# Patient Record
Sex: MALE | Race: WHITE | NOT HISPANIC OR LATINO | Employment: OTHER | URBAN - METROPOLITAN AREA
[De-identification: names, ages, dates, MRNs, and addresses within clinical notes are randomized per-mention and may not be internally consistent; named-entity substitution may affect disease eponyms.]

---

## 2017-05-26 ENCOUNTER — OFFICE VISIT (OUTPATIENT)
Dept: MEDICAL GROUP | Facility: MEDICAL CENTER | Age: 67
End: 2017-05-26
Payer: MEDICARE

## 2017-05-26 VITALS
TEMPERATURE: 97.5 F | HEIGHT: 75 IN | WEIGHT: 225 LBS | OXYGEN SATURATION: 97 % | HEART RATE: 89 BPM | SYSTOLIC BLOOD PRESSURE: 122 MMHG | BODY MASS INDEX: 27.98 KG/M2 | DIASTOLIC BLOOD PRESSURE: 76 MMHG

## 2017-05-26 DIAGNOSIS — E78.5 DYSLIPIDEMIA: Chronic | ICD-10-CM

## 2017-05-26 DIAGNOSIS — Z85.46 HISTORY OF PROSTATE CANCER: Chronic | ICD-10-CM

## 2017-05-26 DIAGNOSIS — N52.9 ERECTILE DYSFUNCTION, UNSPECIFIED ERECTILE DYSFUNCTION TYPE: ICD-10-CM

## 2017-05-26 PROCEDURE — 1036F TOBACCO NON-USER: CPT | Performed by: INTERNAL MEDICINE

## 2017-05-26 PROCEDURE — 4040F PNEUMOC VAC/ADMIN/RCVD: CPT | Performed by: INTERNAL MEDICINE

## 2017-05-26 PROCEDURE — 99213 OFFICE O/P EST LOW 20 MIN: CPT | Performed by: INTERNAL MEDICINE

## 2017-05-26 PROCEDURE — 3017F COLORECTAL CA SCREEN DOC REV: CPT | Performed by: INTERNAL MEDICINE

## 2017-05-26 PROCEDURE — G8419 CALC BMI OUT NRM PARAM NOF/U: HCPCS | Performed by: INTERNAL MEDICINE

## 2017-05-26 PROCEDURE — G8432 DEP SCR NOT DOC, RNG: HCPCS | Performed by: INTERNAL MEDICINE

## 2017-05-26 PROCEDURE — 1101F PT FALLS ASSESS-DOCD LE1/YR: CPT | Performed by: INTERNAL MEDICINE

## 2017-05-26 RX ORDER — M-VIT,TX,IRON,MINS/CALC/FOLIC 27MG-0.4MG
1 TABLET ORAL DAILY
COMMUNITY
End: 2021-02-23

## 2017-05-26 RX ORDER — CHLORAL HYDRATE 500 MG
1000 CAPSULE ORAL
COMMUNITY
End: 2021-02-23

## 2017-05-26 RX ORDER — SILDENAFIL 100 MG/1
100 TABLET, FILM COATED ORAL PRN
Qty: 15 TAB | Refills: 6 | Status: SHIPPED | OUTPATIENT
Start: 2017-05-26 | End: 2017-11-20 | Stop reason: SDUPTHER

## 2017-05-26 ASSESSMENT — PATIENT HEALTH QUESTIONNAIRE - PHQ9: CLINICAL INTERPRETATION OF PHQ2 SCORE: 0

## 2017-05-26 NOTE — PROGRESS NOTES
Subjective:      Roland Hernández is a 66 y.o. male who presents with viagra refill         HPI   Need refill viagra and no side effect with medication, no flushing or headache or vision changes, lives with wife, still exercise walking daily with wife 2.5 miles per day, no chest pain or sob, no lightheadedness or dizziness, no syncope, no headache.  Has blood pressures done through health insurance from work once a year.  Every September.  No anxiety, depression  Viagra has been effective for erectile dysfunction without side effect  No tobacco   etoh 3 drinks per day  Colonoscopy due in November      Current Outpatient Prescriptions   Medication Sig Dispense Refill   • sildenafil citrate (VIAGRA) 100 MG tablet Take 1 Tab by mouth as needed for Erectile Dysfunction (max one per day). 30 Tab 5   • aspirin 81 MG tablet Take 81 mg by mouth every day.     • Calcium Carbonate-Vitamin D (CALCIUM 600 + D PO) Take  by mouth.       No current facility-administered medications for this visit.     Patient Active Problem List    Diagnosis Date Noted   • Impaired fasting glucose 09/14/2013   • S/P appendectomy 09/12/2013   • Preventative health care 09/12/2013   • Schatzki's ring 09/12/2013   • Dyslipidemia 04/09/2013   • Erectile dysfunction 04/09/2013   • History of prostate cancer 04/09/2013   • History of tobacco abuse 04/09/2013       Schatzki's ring  11/14/12 EGD per [facility], schatzki's ring, gastritis, 57 french dilation    Status post appendectomy    Preventative health  11/14/12 [facility] colon repeat 5 yrs per [facility]  4/19/13 pneumovax no records  4/19/13 tdap  9/12/13 zoster vaccine  9/26/13 vit d 40  12/5/14 [facility] x 3 [facility], [employer] benefit plan  [facility]  12/4/15 spirometry FEV1 2.9, 72% predicted,[employer] benefit plan  12/4/15 ekg normal sinus rhythm no change,[employer] benefit plan  12/4/15 FOBT pending per [employer] benefit plan  12/4/15 psa 0.1 done at [facility]  plan    Impaired fasting blood sugar  6/7/10 A1c 6.0%, bs 109  12/5/11 A1c 5.8%, bs 146  9/26/13 A1c 6.1%,bs 140  12/5/14 A1c 5.8%,bs 111  12/5/15 A1c 5.9% done at [facility] benefit plan    History tobacco  Hx of tobacco quit 2010 after smoking 30 yrs    History of prostate cancer  2003 diagnosed with prostate cancer s/p radical prostactomy   4/14/04 psa <0.1  11/5/08 psa <0.1  6/7/10 psa <0.1  12/5/11 psa <0.1  9/26/13 psa <0.01  12/5/14 psa <0.1  12/5/15 psa 0.1 done at [facility] benefit plan    Dyslipidemia  10/17/04 chol 230,trig 67,hdl 63,ldl 154  11/5/08 chol 243,trig 69,hdl 63,ldl 166  2010 started on niacin 2000 mg daily  6/7/10 chol 234,trig 114,hdl 63,ldl 148  12/5/11 chol 198,trig 96,hdl 57,ldl 122,crp 0.8  9/26/13 chol 249,trig 143,hdl 53,ldl 167,LDL-P 2088,HDL-P 42,LP-IR 70  12/5/14 chol 214,trig 62,hdl 74,ldl 128,crp 0.8  12/5/15 chol 190,trig 62,hdl 64,ldl 114 done at [facility] plan    Depression Screening    Little interest or pleasure in doing things?  0 - not at all  Feeling down, depressed , or hopeless? 0 - not at all  Patient Health Questionnaire Score: 0   If depressive symptoms identified deferred to follow up visit unless specifically addressed in assessment and plan.      ROS       Objective:          Physical Exam   Constitutional: He appears well-developed and well-nourished. No distress.   HENT:   Head: Normocephalic and atraumatic.   Mouth/Throat: Oropharynx is clear and moist.   Eyes: Conjunctivae are normal.   Neck: Neck supple. No JVD present.   Cardiovascular: Normal rate, regular rhythm and normal heart sounds.    Pulmonary/Chest: Effort normal. No respiratory distress. He has no wheezes.   Abdominal: He exhibits no distension.   Musculoskeletal: He exhibits no edema.   Skin: Skin is warm. He is not diaphoretic.   Psychiatric: He has a normal mood and affect. His behavior is normal.   Nursing note and vitals reviewed.              Assessment/Plan:     Assessment  #1 erectile dysfunction  stable and controlled with Viagra without side effects    #2 History of prostate cancer last PSA 12/5/15 psa 0.1 done at [facility] benefit plan    #3 dyslipidemia 12/5/15 chol 190,trig 62,hdl 64,ldl 114 done at [facility] benefit plan    #4 Preventative health  11/14/12 [facility] colon repeat 5 yrs per [facility]  4/19/13 pneumovax no records  4/19/13 tdap  9/12/13 zoster vaccine  9/26/13 vit d 40  12/5/14 FOBT x 3 negative in [facility], [facility] benefit plan  12/4/15 audiometry test in [facility] benefit plan  12/4/15 spirometry FEV1 2.9, 72% predicted,[facility] benefit plan  12/4/15 ekg normal sinus rhythm no change,[facility] benefit plan  12/4/15 FOBT pending per [facility] benefit plan  12/4/15 psa 0.1 done at [facility] benefit plan    #5 impaired fasting blood sugar    #6 EtOH 3- drinks per day       Plan   #!  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

## 2017-05-26 NOTE — MR AVS SNAPSHOT
"        Roland Hernández   2017 1:40 PM   Office Visit   MRN: [id number]    Department:  [facility]   Dept Phone:  [phone]    Description:  Male : 1950   Provider:  Marcell Martinez M.D.           Allergies as of 2017     Allergen Noted Reactions    Nkda [No Known Drug Allergy] 2010         You were diagnosed with     Erectile dysfunction, unspecified erectile dysfunction type   [4465665]       Dyslipidemia   [559471]       History of prostate cancer   [793153]         Vital Signs     Blood Pressure Pulse Temperature Height Weight Body Mass Index    122/76 mmHg 89 36.4 °C (97.5 °F) 1.905 m (6' 3\") 102.059 kg (225 lb) 28.12 kg/m2    Oxygen Saturation Smoking Status                97% Former Smoker          Basic Information     Date Of Birth Sex Race Ethnicity Preferred Language    1950 Male White Non- English      Problem List              ICD-10-CM Priority Class Noted - Resolved    Dyslipidemia (Chronic) E78.5   2013 - Present    Erectile dysfunction N52.9   2013 - Present    History of prostate cancer (Chronic) Z85.46   2013 - Present    History of tobacco abuse Z87.891   2013 - Present    S/P appendectomy Z90.49   2013 - Present    Preventative health care (Chronic) Z00.00   2013 - Present    Schatzki's ring K22.2   2013 - Present    Impaired fasting glucose (Chronic) R73.01   2013 - Present      Health Maintenance        Date Due Completion Dates    COLONOSCOPY 2017    IMM PNEUMOCOCCAL 65+ (ADULT) LOW/MEDIUM RISK SERIES (2 of 2 - PPSV23) 2018 10/27/2015, 2013    IMM DTaP/Tdap/Td Vaccine (3 - Td) 10/27/2025 10/27/2015, 2013            Current Immunizations     13-VALENT PCV PREVNAR 10/27/2015    Influenza TIV (IM) 10/26/2014    Pneumococcal polysaccharide vaccine (PPSV-23) 2013    SHINGLES VACCINE 2013    Tdap Vaccine 10/27/2015, 2013      Below and/or attached are the medications " your provider expects you to take. Review all of your home medications and newly ordered medications with your provider and/or pharmacist. Follow medication instructions as directed by your provider and/or pharmacist. Please keep your medication list with you and share with your provider. Update the information when medications are discontinued, doses are changed, or new medications (including over-the-counter products) are added; and carry medication information at all times in the event of emergency situations     Allergies:  NKDA - (reactions not documented)               Medications  Valid as of: May 26, 2017 -  1:58 PM    Generic Name Brand Name Tablet Size Instructions for use    Aspirin (Tab) aspirin 81 MG Take 81 mg by mouth every day.        Calcium Carb-Cholecalciferol   Take  by mouth.        Multiple Vitamins-Minerals (Tab) THERAGRAN-M  Take 1 Tab by mouth every day.        Omega-3 Fatty Acids (Cap) fish oil 1000 MG Take 1,000 mg by mouth 3 times a day, with meals.        Sildenafil Citrate (Tab) VIAGRA 100 MG Take 1 Tab by mouth as needed for Erectile Dysfunction (max one per day).        .                 Medicines prescribed today were sent to:     [facility] [address] [place] [address] - [address]    [address] [facility] [place] [address]    Phone: [phone] Fax: [phone]    Open 24 Hours?: No    [facility] - [address] [facility] [[email or web address] [address]    Phone: [phone] Fax: [phone]    Open 24 Hours?: No      Medication refill instructions:       If your prescription bottle indicates you have medication refills left, it is not necessary to call your provider’s office. Please contact your pharmacy and they will refill your medication.    If your prescription bottle indicates you do not have any refills left, you may request refills at any time through one of the following ways: The online [employer] system (except Urgent  Care), by calling your provider’s office, or by asking your pharmacy to contact your provider’s office with a refill request. Medication refills are processed only during regular business hours and may not be available until the next business day. Your provider may request additional information or to have a follow-up visit with you prior to refilling your medication.   *Please Note: Medication refills are assigned a new Rx number when refilled electronically. Your pharmacy may indicate that no refills were authorized even though a new prescription for the same medication is available at the pharmacy. Please request the medicine by name with the pharmacy before contacting your provider for a refill.        Other Notes About Your Plan     3/2/16 blood pressure readings at home systolic 120-130, diastolic 70-80,drop off blood pressure readings in another 3 months  Need all labs include a1c  11/17 colon due [facility]                   MyChart Access Code: Activation code not generated  Current [employer] Status: Active

## 2017-11-20 DIAGNOSIS — N52.9 ERECTILE DYSFUNCTION, UNSPECIFIED ERECTILE DYSFUNCTION TYPE: ICD-10-CM

## 2017-11-20 RX ORDER — SILDENAFIL 100 MG/1
100 TABLET, FILM COATED ORAL PRN
Qty: 15 TAB | Refills: 5 | Status: SHIPPED | OUTPATIENT
Start: 2017-11-20 | End: 2017-12-05 | Stop reason: SDUPTHER

## 2017-11-21 NOTE — TELEPHONE ENCOUNTER
Viagra    Was the patient seen in the last year in this department? Yes  5/26/17    Does patient have an active prescription for medications requested? No     Received Request Via: Pharmacy

## 2017-12-05 DIAGNOSIS — N52.9 ERECTILE DYSFUNCTION, UNSPECIFIED ERECTILE DYSFUNCTION TYPE: ICD-10-CM

## 2017-12-05 RX ORDER — SILDENAFIL 100 MG/1
100 TABLET, FILM COATED ORAL PRN
Qty: 15 TAB | Refills: 5 | Status: SHIPPED | OUTPATIENT
Start: 2017-12-05 | End: 2018-09-18

## 2017-12-05 NOTE — TELEPHONE ENCOUNTER
Viagra    Was the patient seen in the last year in this department? Yes Last 5/26/17    Does patient have an active prescription for medications requested? No     Received Request Via: Pharmacy

## 2018-05-29 ENCOUNTER — OFFICE VISIT (OUTPATIENT)
Dept: MEDICAL GROUP | Facility: MEDICAL CENTER | Age: 68
End: 2018-05-29
Payer: MEDICARE

## 2018-05-29 ENCOUNTER — TELEPHONE (OUTPATIENT)
Dept: MEDICAL GROUP | Facility: MEDICAL CENTER | Age: 68
End: 2018-05-29

## 2018-05-29 VITALS
SYSTOLIC BLOOD PRESSURE: 124 MMHG | OXYGEN SATURATION: 97 % | BODY MASS INDEX: 26.61 KG/M2 | HEART RATE: 86 BPM | TEMPERATURE: 97.8 F | DIASTOLIC BLOOD PRESSURE: 78 MMHG | HEIGHT: 75 IN | WEIGHT: 214 LBS

## 2018-05-29 DIAGNOSIS — Z85.46 HISTORY OF PROSTATE CANCER: Chronic | ICD-10-CM

## 2018-05-29 DIAGNOSIS — G89.29 CHRONIC RIGHT SHOULDER PAIN: ICD-10-CM

## 2018-05-29 DIAGNOSIS — E78.5 DYSLIPIDEMIA: Chronic | ICD-10-CM

## 2018-05-29 DIAGNOSIS — Z12.5 PROSTATE CANCER SCREENING: ICD-10-CM

## 2018-05-29 DIAGNOSIS — Z23 NEED FOR ZOSTER VACCINE: ICD-10-CM

## 2018-05-29 DIAGNOSIS — R73.01 IMPAIRED FASTING GLUCOSE: Chronic | ICD-10-CM

## 2018-05-29 DIAGNOSIS — Z00.00 PREVENTATIVE HEALTH CARE: Chronic | ICD-10-CM

## 2018-05-29 DIAGNOSIS — Z23 NEED FOR PNEUMOCOCCAL VACCINATION: ICD-10-CM

## 2018-05-29 DIAGNOSIS — M25.519 SHOULDER PAIN, UNSPECIFIED CHRONICITY, UNSPECIFIED LATERALITY: ICD-10-CM

## 2018-05-29 DIAGNOSIS — M25.511 CHRONIC RIGHT SHOULDER PAIN: ICD-10-CM

## 2018-05-29 PROCEDURE — 99214 OFFICE O/P EST MOD 30 MIN: CPT | Performed by: INTERNAL MEDICINE

## 2018-05-29 ASSESSMENT — PATIENT HEALTH QUESTIONNAIRE - PHQ9: CLINICAL INTERPRETATION OF PHQ2 SCORE: 0

## 2018-05-29 NOTE — PROGRESS NOTES
Subjective:      Roland Hernández is a 67 y.o. male who presents with Orders Needed (Referral to [facility] [address] and Medication Refill (Sidenefil [facility]mg to [facility] and 120[facility])            HPI   New complaint right shoulder pain  Has seen [facility] for back and wrist pain previously, more recently has had right shoulder pain and right handed male, mild to moderate pain right shoulder, unable to sleep right side, worse reaching over head and reaching for something behind him, no trauma or falls, no radiation, no sensory changes, no neck pain.  Improved with rest.  Keeps active.  No history of right shoulder dislocation or surgery  Previous pneumococcal 23 vaccine no records  No neck pain, cervical radiculopathy  Previous right wrist surgery, stable occasional right wrist discomfort has seen physical therapy, no NSAIDs, no pain, swelling, decreased right  strength, no sensory changes  No nsaids   No tobacco  Impaired fasting blood sugar, no medication, working on diet, nutrition, no history of diabetes, no sensory changes, no neuropathy, retinopathy, nephropathy  Dyslipidemia diet-controlled  History prostate cancer no recurrence, no dysuria, hematuria, incontinence    Current Outpatient Prescriptions   Medication Sig Dispense Refill   • sildenafil citrate (VIAGRA) 100 MG tablet Take 1 Tab by mouth as needed for Erectile Dysfunction (max one per day). 15 Tab 5   • Omega-3 Fatty Acids (FISH OIL) 1000 MG Cap capsule Take 1,000 mg by mouth 3 times a day, with meals.     • therapeutic multivitamin-minerals (THERAGRAN-M) Tab Take 1 Tab by mouth every day.     • aspirin 81 MG tablet Take 81 mg by mouth every day.     • Calcium Carbonate-Vitamin D (CALCIUM 600 + D PO) Take  by mouth.       No current facility-administered medications for this visit.       Depression Screening    Little interest or pleasure in doing things?  0 - not at all  Feeling down, depressed , or  hopeless? 0 - not at all  Patient Health Questionnaire Score: 0       Health Maintenance Summary                Annual Wellness Visit Overdue 12/27/2015      Done 12/26/2014 Visit Dx: Medicare annual wellness visit, subsequent    COLONOSCOPY Overdue 11/14/2017      Done 11/14/2012 AMB REFERRAL TO GI FOR COLONOSCOPY    IMM PNEUMOCOCCAL 65+ (ADULT) LOW/MEDIUM RISK SERIES Overdue 4/9/2018      Done 10/27/2015 Imm Admin: Pneumococcal Conjugate Vaccine (Prevnar/PCV-13)     Patient has more history with this topic...    IMM INFLUENZA Next Due 9/1/2018      Done 10/26/2014 Imm Admin: Influenza TIV (IM)    IMM DTaP/Tdap/Td Vaccine Next Due 10/27/2025      Done 10/27/2015 Imm Admin: Tdap Vaccine     Patient has more history with this topic...          Patient Care Team:  Marcell Martinez M.D. as PCP - General (Internal Medicine)    Schatzki's ring  11/14/12 EGD per [facility], schatzki's ring, gastritis, 57 Cuban dilation     Status post appendectomy     Preventative health  11/14/12 [facility] colon repeat 5 yrs per [facility]  4/19/13 pneumovax no records  4/19/13 tdap  9/12/13 zoster vaccine  9/26/13 vit d 40  12/5/14 FOBT x 3 negative in [facility] benefit plan  10/27/15 prevnar  [facility]  12/4/15 spirometry FEV1 2.9, 72% predicted,[employer] benefit plan  12/4/15 EKG normal sinus rhythm no change,[employer] benefit plan  12/4/15 FOBT pending per [employer] benefit plan  10/17/17 cxr per [facility] plan negative  10/17/17 EKG per [facility] plan negative  10/17/17 psa<0.1 per [employer] plan  10/17/17 spirometry FEV1 2.9,FVC 4.0 per [facility] plan    Impaired fasting blood sugar  6/7/10 A1c 6.0%, bs 109  12/5/11 A1c 5.8%, bs 146  9/26/13 A1c 6.1%,bs 140  12/5/14 A1c 5.8%,bs 111  12/5/15 A1c 5.9% done at [employer] benefit plan  10/17/17 A1c 5.5% per [employer] plan     History tobacco  Hx of tobacco quit 2010 after smoking 30 yrs     History of prostate cancer  2003 diagnosed with prostate cancer s/p radical prostactomy   4/14/04  "psa <0.1  11/5/08 psa <0.1  6/7/10 psa <0.1  12/5/11 psa <0.1  9/26/13 psa <0.01  12/5/14 psa <0.1  12/5/15 psa 0.1 done at [facility] benefit plan  10/17/17 psa<0.1 per [employer] plan     Dyslipidemia  10/17/04 chol 230,trig 67,hdl 63,ldl 154  11/5/08 chol 243,trig 69,hdl 63,ldl 166  2010 started on niacin 2000 mg daily  6/7/10 chol 234,trig 114,hdl 63,ldl 148  12/5/11 chol 198,trig 96,hdl 57,ldl 122,crp 0.8  9/26/13 chol 249,trig 143,hdl 53,ldl 167,LDL-P 2088,HDL-P 42,LP-IR 70  12/5/14 chol 214,trig 62,hdl 74,ldl 128,crp 0.8  12/5/15 chol 190,trig 62,hdl 64,ldl 114 done at [facility] benefit [facility]  10/17/17 chol 250,trig 134,hdl 58,ldl 165 per [facility]       Patient Active Problem List   Diagnosis   • Dyslipidemia   • Erectile dysfunction   • History of prostate cancer   • History of tobacco abuse   • S/P appendectomy   • Preventative health care   • Schatzki's ring   • Impaired fasting glucose         ROS       Objective:     /78   Pulse 86   Temp 36.6 °C (97.8 °F)   Ht 1.905 m (6' 3\")   Wt 97.1 kg (214 lb)   SpO2 97%   BMI 26.75 kg/m²      Physical Exam   Constitutional: He appears well-developed and well-nourished. No distress.   HENT:   Head: Normocephalic and atraumatic.   Right Ear: External ear normal.   Left Ear: External ear normal.   Mouth/Throat: Oropharynx is clear and moist.   Eyes: Conjunctivae are normal. Right eye exhibits no discharge. Left eye exhibits no discharge.   Neck: Neck supple. No JVD present. No thyromegaly present.   Cardiovascular: Normal rate, regular rhythm and normal heart sounds.    Pulmonary/Chest: Effort normal and breath sounds normal. No respiratory distress. He has no wheezes.   Abdominal: He exhibits no distension.   Musculoskeletal: He exhibits no edema.   Skin: Skin is warm. He is not diaphoretic.   Psychiatric: He has a normal mood and affect. His behavior is normal.   Nursing note and vitals reviewed.    Right shoulder no tenderness to palpation clavicle glenohumeral " joint  Right shoulder range of motion 120° abduction, 150° extension  No right elbow pain, normal right elbow range of motion  No right forearm pain or discomfort, no edema  Normal right  strength  Normal sensation hand          Assessment/Plan:     Assessment  #! Shoulder pain right side with questionable impingement syndrome versus calcium deposits, less likely rotator cuff, no evidence of cervical radiculopathy    #2 impaired fasting blood sugar    #3 history of prostate cancer status post prostatectomy    #4 dyslipidemia diet-controlled    #5 history of low vitamin D      Plan  #1 physical therapy right shoulder    #2 right shoulder x-ray    #3 over-the-counter Aleve if necessary    #4 labs    #5 had colonoscopy a few months ago digestive health will obtain records    #6 we are out of pneumococcal 23 vaccination, he will schedule his assessment, we can administer pneumococcal 23 if in stock at that time    #6 prescription shingles vaccine provided

## 2018-05-29 NOTE — LETTER
[facility]  Marcell Martinez M.D.  [address]  [place] [address]  Fax: [phone]   Authorization for Release/Disclosure of   Protected Health Information   Name: LENO HDZ : 1950 SSN: [id number]   Address: [address] [place] [address] [place] [address] Phone:    [phone] (home)    I authorize the entity listed below to release/disclose the PHI below to:   [facility]/Marcell Martinez M.D. and Marcell Martinez M.D.   Provider or Entity Name:  [facility]   Address   City, State, Zip:               [address] [place] [address], [place] [address]   Phone:  [phone]      Fax:      [phone]        Reason for request: continuity of care   Information to be released:    [ X ] LAST COLONOSCOPY,  including any PATH REPORT and follow-up  [ X ] LAST FIT/COLOGUARD RESULT [  ] LAST DEXA  [  ] LAST MAMMOGRAM  [  ] LAST PAP  [  ] LAST LABS [  ] RETINA EXAM REPORT  [  ] IMMUNIZATION RECORDS  [  ] Release all info      [  ] Check here and initial the line next to each item to release ALL health information INCLUDING  _____ Care and treatment for drug and / or alcohol abuse  _____ HIV testing, infection status, or AIDS  _____ Genetic Testing    DATES OF SERVICE OR TIME PERIOD TO BE DISCLOSED: _____________  I understand and acknowledge that:  * This Authorization may be revoked at any time by you in writing, except if your health information has already been used or disclosed.  * Your health information that will be used or disclosed as a result of you signing this authorization could be re-disclosed by the recipient. If this occurs, your re-disclosed health information may no longer be protected by State or Federal laws.  * You may refuse to sign this Authorization. Your refusal will not affect your ability to obtain treatment.  * This Authorization becomes effective upon signing and will  on (date) __________.      If no date is indicated, this Authorization will   one (1) year from the signature date.    Name: Roland Hernández    Signature:   Date:     5/29/2018       PLEASE FAX REQUESTED RECORDS BACK TO: [phone]

## 2018-06-07 ENCOUNTER — HOSPITAL ENCOUNTER (OUTPATIENT)
Dept: RADIOLOGY | Facility: MEDICAL CENTER | Age: 68
End: 2018-06-07
Attending: INTERNAL MEDICINE
Payer: MEDICARE

## 2018-06-07 ENCOUNTER — HOSPITAL ENCOUNTER (OUTPATIENT)
Dept: LAB | Facility: MEDICAL CENTER | Age: 68
End: 2018-06-07
Attending: INTERNAL MEDICINE
Payer: MEDICARE

## 2018-06-07 ENCOUNTER — TELEPHONE (OUTPATIENT)
Dept: MEDICAL GROUP | Facility: MEDICAL CENTER | Age: 68
End: 2018-06-07

## 2018-06-07 DIAGNOSIS — Z12.5 PROSTATE CANCER SCREENING: ICD-10-CM

## 2018-06-07 DIAGNOSIS — R73.01 IMPAIRED FASTING GLUCOSE: Chronic | ICD-10-CM

## 2018-06-07 DIAGNOSIS — E78.5 DYSLIPIDEMIA: Chronic | ICD-10-CM

## 2018-06-07 DIAGNOSIS — M25.519 SHOULDER PAIN, UNSPECIFIED CHRONICITY, UNSPECIFIED LATERALITY: ICD-10-CM

## 2018-06-07 LAB
ALBUMIN SERPL BCP-MCNC: 4 G/DL (ref 3.2–4.9)
ALBUMIN/GLOB SERPL: 1.5 G/DL
ALP SERPL-CCNC: 33 U/L (ref 30–99)
ALT SERPL-CCNC: 38 U/L (ref 2–50)
ANION GAP SERPL CALC-SCNC: 7 MMOL/L (ref 0–11.9)
AST SERPL-CCNC: 39 U/L (ref 12–45)
BASOPHILS # BLD AUTO: 1.2 % (ref 0–1.8)
BASOPHILS # BLD: 0.05 K/UL (ref 0–0.12)
BILIRUB SERPL-MCNC: 0.8 MG/DL (ref 0.1–1.5)
BUN SERPL-MCNC: 12 MG/DL (ref 8–22)
CALCIUM SERPL-MCNC: 9.2 MG/DL (ref 8.5–10.5)
CHLORIDE SERPL-SCNC: 103 MMOL/L (ref 96–112)
CHOLEST SERPL-MCNC: 218 MG/DL (ref 100–199)
CO2 SERPL-SCNC: 27 MMOL/L (ref 20–33)
CREAT SERPL-MCNC: 0.8 MG/DL (ref 0.5–1.4)
EOSINOPHIL # BLD AUTO: 0.11 K/UL (ref 0–0.51)
EOSINOPHIL NFR BLD: 2.6 % (ref 0–6.9)
ERYTHROCYTE [DISTWIDTH] IN BLOOD BY AUTOMATED COUNT: 42.9 FL (ref 35.9–50)
EST. AVERAGE GLUCOSE BLD GHB EST-MCNC: 111 MG/DL
GLOBULIN SER CALC-MCNC: 2.6 G/DL (ref 1.9–3.5)
GLUCOSE SERPL-MCNC: 135 MG/DL (ref 65–99)
HBA1C MFR BLD: 5.5 % (ref 0–5.6)
HCT VFR BLD AUTO: 42.6 % (ref 42–52)
HDLC SERPL-MCNC: 65 MG/DL
HGB BLD-MCNC: 14.3 G/DL (ref 14–18)
IMM GRANULOCYTES # BLD AUTO: 0.01 K/UL (ref 0–0.11)
IMM GRANULOCYTES NFR BLD AUTO: 0.2 % (ref 0–0.9)
LDLC SERPL CALC-MCNC: 141 MG/DL
LYMPHOCYTES # BLD AUTO: 1.5 K/UL (ref 1–4.8)
LYMPHOCYTES NFR BLD: 35.2 % (ref 22–41)
MCH RBC QN AUTO: 32.1 PG (ref 27–33)
MCHC RBC AUTO-ENTMCNC: 33.6 G/DL (ref 33.7–35.3)
MCV RBC AUTO: 95.5 FL (ref 81.4–97.8)
MONOCYTES # BLD AUTO: 0.46 K/UL (ref 0–0.85)
MONOCYTES NFR BLD AUTO: 10.8 % (ref 0–13.4)
NEUTROPHILS # BLD AUTO: 2.13 K/UL (ref 1.82–7.42)
NEUTROPHILS NFR BLD: 50 % (ref 44–72)
NRBC # BLD AUTO: 0 K/UL
NRBC BLD-RTO: 0 /100 WBC
PLATELET # BLD AUTO: 247 K/UL (ref 164–446)
PMV BLD AUTO: 10 FL (ref 9–12.9)
POTASSIUM SERPL-SCNC: 4.7 MMOL/L (ref 3.6–5.5)
PROT SERPL-MCNC: 6.6 G/DL (ref 6–8.2)
PSA SERPL-MCNC: <0.01 NG/ML (ref 0–4)
RBC # BLD AUTO: 4.46 M/UL (ref 4.7–6.1)
SODIUM SERPL-SCNC: 137 MMOL/L (ref 135–145)
TRIGL SERPL-MCNC: 59 MG/DL (ref 0–149)
TSH SERPL DL<=0.005 MIU/L-ACNC: 3.35 UIU/ML (ref 0.38–5.33)
WBC # BLD AUTO: 4.3 K/UL (ref 4.8–10.8)

## 2018-06-07 PROCEDURE — 73030 X-RAY EXAM OF SHOULDER: CPT | Mod: RT

## 2018-06-07 PROCEDURE — 83036 HEMOGLOBIN GLYCOSYLATED A1C: CPT | Mod: GA

## 2018-06-07 PROCEDURE — 80053 COMPREHEN METABOLIC PANEL: CPT

## 2018-06-07 PROCEDURE — 84153 ASSAY OF PSA TOTAL: CPT | Mod: GA

## 2018-06-07 PROCEDURE — 85025 COMPLETE CBC W/AUTO DIFF WBC: CPT

## 2018-06-07 PROCEDURE — 36415 COLL VENOUS BLD VENIPUNCTURE: CPT

## 2018-06-07 PROCEDURE — 80061 LIPID PANEL: CPT

## 2018-06-07 PROCEDURE — 84443 ASSAY THYROID STIM HORMONE: CPT

## 2018-06-07 NOTE — TELEPHONE ENCOUNTER
Please notify patient that his shoulder x-ray shows mild arthritis, we will proceed with physical therapy

## 2018-06-08 NOTE — TELEPHONE ENCOUNTER
Notified with labs work on nutrition, diet, exercise    Notified with x-ray continue physical therapy

## 2018-09-18 ENCOUNTER — OFFICE VISIT (OUTPATIENT)
Dept: MEDICAL GROUP | Facility: MEDICAL CENTER | Age: 68
End: 2018-09-18
Payer: MEDICARE

## 2018-09-18 ENCOUNTER — TELEPHONE (OUTPATIENT)
Dept: MEDICAL GROUP | Facility: MEDICAL CENTER | Age: 68
End: 2018-09-18

## 2018-09-18 VITALS
SYSTOLIC BLOOD PRESSURE: 120 MMHG | HEART RATE: 70 BPM | TEMPERATURE: 97.2 F | BODY MASS INDEX: 26.11 KG/M2 | HEIGHT: 75 IN | WEIGHT: 210 LBS | OXYGEN SATURATION: 96 % | DIASTOLIC BLOOD PRESSURE: 84 MMHG | RESPIRATION RATE: 16 BRPM

## 2018-09-18 DIAGNOSIS — Z00.00 PREVENTATIVE HEALTH CARE: Chronic | ICD-10-CM

## 2018-09-18 DIAGNOSIS — Z00.00 MEDICARE ANNUAL WELLNESS VISIT, SUBSEQUENT: Primary | ICD-10-CM

## 2018-09-18 DIAGNOSIS — Z23 NEEDS FLU SHOT: ICD-10-CM

## 2018-09-18 DIAGNOSIS — Z23 NEED FOR PNEUMOCOCCAL VACCINATION: ICD-10-CM

## 2018-09-18 DIAGNOSIS — E78.5 DYSLIPIDEMIA: Chronic | ICD-10-CM

## 2018-09-18 DIAGNOSIS — N52.9 ERECTILE DYSFUNCTION, UNSPECIFIED ERECTILE DYSFUNCTION TYPE: ICD-10-CM

## 2018-09-18 PROCEDURE — G0009 ADMIN PNEUMOCOCCAL VACCINE: HCPCS | Performed by: INTERNAL MEDICINE

## 2018-09-18 PROCEDURE — G0008 ADMIN INFLUENZA VIRUS VAC: HCPCS | Performed by: INTERNAL MEDICINE

## 2018-09-18 PROCEDURE — 90662 IIV NO PRSV INCREASED AG IM: CPT | Performed by: INTERNAL MEDICINE

## 2018-09-18 PROCEDURE — 90732 PPSV23 VACC 2 YRS+ SUBQ/IM: CPT | Performed by: INTERNAL MEDICINE

## 2018-09-18 PROCEDURE — G0439 PPPS, SUBSEQ VISIT: HCPCS | Mod: 25 | Performed by: INTERNAL MEDICINE

## 2018-09-18 RX ORDER — SILDENAFIL 100 MG/1
100 TABLET, FILM COATED ORAL PRN
Qty: 30 TAB | Refills: 5 | Status: SHIPPED | OUTPATIENT
Start: 2018-09-18 | End: 2019-09-30 | Stop reason: SDUPTHER

## 2018-09-18 ASSESSMENT — ENCOUNTER SYMPTOMS
GENERAL WELL-BEING: EXCELLENT
DOUBLE VISION: 0
ABDOMINAL PAIN: 0
DIZZINESS: 0
HEARTBURN: 0
PALPITATIONS: 0
CONSTIPATION: 0
BLOOD IN STOOL: 0
INSOMNIA: 0
BACK PAIN: 0
DEPRESSION: 0
WEIGHT LOSS: 0
SHORTNESS OF BREATH: 0
BLURRED VISION: 0

## 2018-09-18 ASSESSMENT — PATIENT HEALTH QUESTIONNAIRE - PHQ9: CLINICAL INTERPRETATION OF PHQ2 SCORE: 0

## 2018-09-18 ASSESSMENT — ACTIVITIES OF DAILY LIVING (ADL): BATHING_REQUIRES_ASSISTANCE: 0

## 2018-09-18 NOTE — PROGRESS NOTES
Subjective:      Roland Hernández is a 67 y.o. male who presents wellness assessment          HPI     CC:  Health Risk Assessment Exam.    HPI: henok Watkins is a 67 y.o. here for Health Risk Assessment.     PCP: Marcell Martinez M.D.  Other specialists:orthopedic at [facility]  Medication, allergies, medical history, surgical history, social history, family history reviewed and updated    SH , weight training 2 times per week and walking three miles per day 3-4 times per week, etoh 2 per day, no soda, drinks water during the day 60-80 oz         Patient Active Problem List    Diagnosis Date Noted   • Shoulder pain 05/29/2018   • Impaired fasting glucose 09/14/2013   • S/P appendectomy 09/12/2013   • Preventative health care 09/12/2013   • Schatzki's ring 09/12/2013   • Dyslipidemia 04/09/2013   • Erectile dysfunction 04/09/2013   • History of prostate cancer 04/09/2013   • History of tobacco abuse 04/09/2013     Current Outpatient Prescriptions   Medication Sig Dispense Refill   • sildenafil citrate (VIAGRA) 100 MG tablet Take 1 Tab by mouth as needed for Erectile Dysfunction. One per day 30 Tab 5   • Omega-3 Fatty Acids (FISH OIL) 1000 MG Cap capsule Take 1,000 mg by mouth 3 times a day, with meals.     • therapeutic multivitamin-minerals (THERAGRAN-M) Tab Take 1 Tab by mouth every day.     • Calcium Carbonate-Vitamin D (CALCIUM 600 + D PO) Take  by mouth.       No current facility-administered medications for this visit.       Current supplements:reviewed  Chronic narcotic pain medicines: no  Allergies: Nkda [no known drug allergy]    Screening:reviewed  Depressive Symptoms: Denies feeling down, depressed or hopeless. Denies loss of interest or pleasure in doing things  ADLs: Denies needing help with using telephone, transportation, shopping, preparing meals, housework, laundry, or managing medication or money.   Independent with bathing, hygiene, feeding, toileting, dressing   Memory  "concerns: Denies difficulty remembering details of conversations, events and upcoming appointments.  Hearing problems: Denies.   Recent falls no  Current social contact/activities:reviewed  Social History   Substance Use Topics   • Smoking status: Former Smoker     Packs/day: 1.50   • Smokeless tobacco: Never Used      Comment: 1 ppd 25 yrs,quit 2011   • Alcohol use 12.6 oz/week     21 Glasses of wine per week      Comment: 3 drinks a day       Family History   Problem Relation Age of Onset   • Dementia Mother    • Cancer Father         bladder cancer     Past Surgical History:   Procedure Laterality Date   • MASS EXCISION ORTHO  3/22/2010    [facility]   • PROSTATECTOMY ROBOTIC  2003   • APPENDECTOMY     • TONSILLECTOMY              Ostomy or other tubes or amputations no  Chronic oxygen use no            /84   Pulse 70   Temp 36.2 °C (97.2 °F)   Resp 16   Ht 1.905 m (6' 3\")   Wt 95.3 kg (210 lb)   SpO2 96%   BMI 26.25 kg/m²  Body mass index is 26.25 kg/m².       Gait: normal. Uses assistive device :no           Health Care Screening recommendations reviewed with patient today and updated or ordered.  DPA/Advanced directive: Completed/Information provided.    Referrals for PT/OT/Nutrition counseling/Behavioral Health/Smoking cessation as above if indicated  Discussion today about general wellness and lifestyle habits:    · Prevent falls and reduce trip hazards;   · Have a working fire alarm and carbon monoxide detector;   · Engage in regular physical activity and social activities;   · Use sun protection when outdoors.         Current Outpatient Prescriptions   Medication Sig Dispense Refill   • sildenafil citrate (VIAGRA) 100 MG tablet Take 1 Tab by mouth as needed for Erectile Dysfunction (max one per day). 15 Tab 5   • Omega-3 Fatty Acids (FISH OIL) 1000 MG Cap capsule Take 1,000 mg by mouth 3 times a day, with meals.     • therapeutic " multivitamin-minerals (THERAGRAN-M) Tab Take 1 Tab by mouth every day.     • aspirin 81 MG tablet Take 81 mg by mouth every day.     • Calcium Carbonate-Vitamin D (CALCIUM 600 + D PO) Take  by mouth.       No current facility-administered medications for this visit.      Shoulder pain  5/29/18 refer to sports performance phone [phone], fax [phone]  6/7/18 right shoulder x-ray arthritis proceed with physical therapy  6/7/18 sports performance PT note    Schatzki's ring  11/14/12 EGD per [facility], schatzki's ring, gastritis, 57 [place] dilation     Status post appendectomy     Preventative health  4/19/13 pneumovax no records  4/19/13 tdap  9/12/13 zoster vaccine  9/26/13 vit d 40  12/5/14 FOBT x 3 negative in [facility], [facility] benefit plan  10/27/15 prevnar  [facility]  12/4/15 spirometry FEV1 2.9, 72% predicted,[facility] benefit plan  12/4/15 EKG normal sinus rhythm no change,[facility] benefit plan  12/4/15 FOBT pending per [facility] benefit plan  10/17/17 cxr per [facility] plan negative  10/17/17 EKG per [facility] plan negative  10/17/17 spirometry FEV1 2.9,FVC 4.0 per [facility] plan  11/13/17 colon per [facility] diverticulosis, otherwise negative repeat 10 years  5/29/18 shingrix prescription provided to get pharmacy  5/29/18 we are out of pneumococcal 23  6/7/18 psa<0.01     Impaired fasting blood sugar  6/7/10 A1c 6.0%, bs 109  12/5/11 A1c 5.8%, bs 146  9/26/13 A1c 6.1%,bs 140  12/5/14 A1c 5.8%,bs 111  12/5/15 A1c 5.9% done at [facility] benefit plan  10/17/17 A1c 5.5% per [facility] plan  6/7/18 A1c 5.5%     History tobacco  Hx of tobacco quit 2010 after smoking 30 yrs     History of prostate cancer  2003 diagnosed with prostate cancer s/p radical prostactomy   4/14/04 psa <0.1  11/5/08 psa <0.1  6/7/10 psa <0.1  12/5/11 psa <0.1  9/26/13 psa <0.01  12/5/14 psa <0.1  12/5/15 psa 0.1 done at [facility] benefit plan  10/17/17 psa<0.1 per [facility] plan  6/7/18 psa<0.01     Dyslipidemia  10/17/04 chol 230,trig 67,hdl 63,ldl  154  11/5/08 chol 243,trig 69,hdl 63,ldl 166  2010 started on niacin 2000 mg daily  6/7/10 chol 234,trig 114,hdl 63,ldl 148  12/5/11 chol 198,trig 96,hdl 57,ldl 122,crp 0.8  9/26/13 chol 249,trig 143,hdl 53,ldl 167,LDL-P 2088,HDL-P 42,LP-IR 70  12/5/14 chol 214,trig 62,hdl 74,ldl 128,crp 0.8  12/5/15 chol 190,trig 62,hdl 64,ldl 114 done at [facility] benefit plan  10/17/17 chol 250,trig 134,hdl 58,ldl 165 per [facility] plan  6/7/18 chol 218,trig 59,hdl 65,ldl 141     Depression Screening    Little interest or pleasure in doing things?  0 - not at all  Feeling down, depressed , or hopeless? 0 - not at all  Patient Health Questionnaire Score: 0     If depressive symptoms identified deferred to follow up visit unless specifically addressed in assessment and plan.    Interpretation of PHQ-9 Total Score   Score Severity   1-4 No Depression   5-9 Mild Depression   10-14 Moderate Depression   15-19 Moderately Severe Depression   20-27 Severe Depression    Screening for Cognitive Impairment    Three Minute Recall (leader, season, table) 3/3    Wally clock face with all 12 numbers and set the hands to show 10 past 11.  Yes    Cognitive concerns identified deferred for follow up unless specifically addressed in assessment and plan.    Fall Risk Assessment    Has the patient had two or more falls in the last year or any fall with injury in the last year?       Safety Assessment    Throw rugs on floor.  Yes  Handrails on all stairs.  Yes  Good lighting in all hallways.  Yes  Difficulty hearing.  No  Patient counseled about all safety risks that were identified.    Functional Assessment ADLs    Are there any barriers preventing you from cooking for yourself or meeting nutritional needs?  No.    Are there any barriers preventing you from driving safely or obtaining transportation?  No.    Are there any barriers preventing you from using a telephone or calling for help?  No.    Are there any barriers preventing you from shopping?  No.    Are  there any barriers preventing you from taking care of your own finances?  No.    Are there any barriers preventing you from managing your medications?  No.    Are there any barriers preventing you from showering, bathing or dressing yourself?  No.    Are you currently engaging in any exercise or physical activity?  Yes.     What is your perception of your health?  Excellent.      Health Maintenance Summary                IMM ZOSTER VACCINES Overdue 11/7/2013      Done 9/12/2013 Imm Admin: Zoster Vaccine Live (ZVL) (Zostavax)    Annual Wellness Visit Overdue 12/27/2015      Done 12/26/2014 Visit Dx: Medicare annual wellness visit, subsequent    IMM PNEUMOCOCCAL 65+ (ADULT) LOW/MEDIUM RISK SERIES Overdue 4/9/2018      Done 10/27/2015 Imm Admin: Pneumococcal Conjugate Vaccine (Prevnar/PCV-13)     Patient has more history with this topic...    IMM INFLUENZA Overdue 9/1/2018      Done 9/29/2017 Ext Imm: Influenza Quad Inj P     Patient has more history with this topic...    IMM DTaP/Tdap/Td Vaccine Next Due 10/27/2025      Done 10/27/2015 Imm Admin: Tdap Vaccine     Patient has more history with this topic...    COLONOSCOPY Next Due 11/13/2027      Done 11/13/2017 REFERRAL TO GI FOR COLONOSCOPY     Patient has more history with this topic...          Patient Care Team:  Marcell Martinez M.D. as PCP - General (Internal Medicine)      Patient Active Problem List   Diagnosis   • Dyslipidemia   • Erectile dysfunction   • History of prostate cancer   • History of tobacco abuse   • S/P appendectomy   • Preventative health care   • Schatzki's ring   • Impaired fasting glucose   • Shoulder pain         Review of Systems   Constitutional: Negative for weight loss.   HENT: Negative for hearing loss.    Eyes: Negative for blurred vision and double vision.   Respiratory: Negative for shortness of breath.    Cardiovascular: Negative for chest pain and palpitations.   Gastrointestinal: Negative for abdominal pain, blood in stool,  constipation and heartburn.   Genitourinary: Negative for frequency.   Musculoskeletal: Negative for back pain.   Neurological: Negative for dizziness.   Endo/Heme/Allergies: Negative for environmental allergies.   Psychiatric/Behavioral: Negative for depression. The patient does not have insomnia.           Objective:        Physical Exam   Constitutional: He appears well-developed and well-nourished. No distress.   HENT:   Head: Normocephalic and atraumatic.   Right Ear: External ear normal.   Left Ear: External ear normal.   Mouth/Throat: Oropharynx is clear and moist.   Eyes: Conjunctivae are normal. Right eye exhibits no discharge. Left eye exhibits no discharge.   Neck: Neck supple. No JVD present. No thyromegaly present.   Cardiovascular: Normal rate and regular rhythm.    Pulmonary/Chest: Effort normal and breath sounds normal. No respiratory distress. He has no wheezes.   Abdominal: He exhibits no distension.   Musculoskeletal: He exhibits no edema.   Neurological: He is alert.   Skin: Skin is warm. He is not diaphoretic.   Psychiatric: He has a normal mood and affect. His behavior is normal.   Nursing note and vitals reviewed.              Assessment/Plan:     Assessment  #! Wellness assessment    #2 wrist pain sees orthopedics [facility] no records    #3 Preventative health  4/19/13 pneumovax no records  4/19/13 tdap  9/12/13 zoster vaccine  9/26/13 vit d 40  12/5/14 FOBT x 3 negative in [facility], [facility] benefit plan  10/27/15 prevnar  12/4/15 audiometry test in [place] [facility] benefit plan  12/4/15 spirometry FEV1 2.9, 72% predicted,[facility] benefit plan  12/4/15 EKG normal sinus rhythm no change,[facility] benefit plan  12/4/15 FOBT pending per [facility] benefit plan  10/17/17 cxr per [facility] plan negative  10/17/17 EKG per [facility] plan negative  10/17/17 spirometry FEV1 2.9,FVC 4.0 per [facility] plan  11/13/17 colon per [facility] diverticulosis, otherwise negative repeat 10 years  5/29/18 shingrix prescription provided to  get pharmacy  5/29/18 we are out of pneumococcal 23  6/7/18 psa<0.01     #4 Impaired fasting blood sugar 6/7/18 A1c 5.5%     #5 History tobacco quit 2010 after smoking 30 yrs     #6 History of prostate cancer 2003 diagnosed with prostate cancer s/p radical prostactomy no recurrence most recent PSA 6/7/18 psa<0.01     #7 Dyslipidemia 6/7/18 chol 218,trig 59,hdl 65,ldl 141 diet-controlled       Plan  #! Health maintenance reviewed and updated    #2 flu high dose vaccination today    #3 pneumonia 23 vaccine pneumococcal 13 today, has had tdap    #4 old records [facility] orthopedics regarding wrist    #5 declines hearing test    #6 declines PT     #7 reviewed labs from 6/7/18     #8 CT calcium score ordered, patient understands this and out-of-pocket expense, insurance will not cover, I think this is reasonable for risk stratification    #9 next colon 2027    #10  Nutrition, diet, exercise discussed     #11 follow-up 1 year

## 2019-08-13 ENCOUNTER — PATIENT OUTREACH (OUTPATIENT)
Dept: HEALTH INFORMATION MANAGEMENT | Facility: OTHER | Age: 69
End: 2019-08-13

## 2019-08-13 SDOH — ECONOMIC STABILITY: FOOD INSECURITY: WITHIN THE PAST 12 MONTHS, YOU WORRIED THAT YOUR FOOD WOULD RUN OUT BEFORE YOU GOT MONEY TO BUY MORE.: NEVER TRUE

## 2019-08-13 SDOH — ECONOMIC STABILITY: FOOD INSECURITY: WITHIN THE PAST 12 MONTHS, THE FOOD YOU BOUGHT JUST DIDN'T LAST AND YOU DIDN'T HAVE MONEY TO GET MORE.: NEVER TRUE

## 2019-08-13 NOTE — PROGRESS NOTES
1. Attempt #:1    2. HealthConnect Verified: no    3. Verify PCP: yes    4. Review Care Team: yes    5. WebIZ Checked & Epic Updated: Yes  · WebIZ Recommendations: SHINGRIX (Shingles)  · Is patient due for Tdap? NO  · Is patient due for Shingles? YES. Patient was not notified of copay/out of pocket cost.    6. Reviewed/Updated the following with patient:       •   Communication Preference Obtained? YES       •   Preferred Pharmacy? YES       •   Preferred Lab? YES       •   Family History (document living status of immediate family members and if + hx of cancer, diabetes, hypertension, hyperlipidemia, heart attack, stroke) YES    7. Annual Wellness Visit Scheduling  · Scheduling Status:Scheduled     8. Care Gap Scheduling (Attempt to Schedule EACH Overdue Care Gap!)     Health Maintenance Due   Topic Date Due   • HEPATITIS C SCREENING  1950   • IMM ZOSTER VACCINES (3 of 3) 02/07/2019        Scheduled patient for Annual Wellness Visit     9. Konbini Activation: already active    10. Konbini Vinicio: no    11. Virtual Visits: no    12. Opt In to Text Messages: yes    13. Patient was advised: “This is a free wellness visit. The provider will screen for medical conditions to help you stay healthy. If you have other concerns to address you may be asked to discuss these at a separate visit or there may be an additional fee.”     14. Patient was informed to arrive 15 min prior to their scheduled appointment and bring in their medication bottles.

## 2019-09-24 ENCOUNTER — TELEPHONE (OUTPATIENT)
Dept: MEDICAL GROUP | Facility: MEDICAL CENTER | Age: 69
End: 2019-09-24

## 2019-09-24 NOTE — TELEPHONE ENCOUNTER
Future Appointments       Provider Department Center    9/30/2019 3:00 PM Marcell NEFTALI Martinez M.D.; [facility]          ANNUAL WELLNESS VISIT PRE-VISIT PLANNING WITH OUTREACH    1.  If any orders were placed at last visit, do we have Results/Consult Notes?        •  Labs - Labs were not ordered at last office visit.  Note: If patient appointment is for lab review and patient did not complete labs, check with provider if OK to reschedule patient until labs completed.       •  Imaging - Imaging ordered but not completed       •  Referrals - No referrals were ordered at last office visit.    2.  Immunizations were updated in Epic using WebIZ?:Yes       •  WebIZ Recommendations: FLU and SHINGRIX (Shingles)       •  Is patient due for Tdap? NO       •  Is patient due for Shingrx? YES. Patient was not notified of copay/out of pocket cost.    3.  Patient has the following Care Path diagnoses on Problem List:  NONE    4.  Orders for overdue Health Maintenance topics pended in Pre-Charting? NO

## 2019-09-30 ENCOUNTER — OFFICE VISIT (OUTPATIENT)
Dept: MEDICAL GROUP | Facility: MEDICAL CENTER | Age: 69
End: 2019-09-30
Payer: MEDICARE

## 2019-09-30 VITALS
WEIGHT: 216.4 LBS | HEART RATE: 74 BPM | BODY MASS INDEX: 26.91 KG/M2 | HEIGHT: 75 IN | SYSTOLIC BLOOD PRESSURE: 147 MMHG | TEMPERATURE: 97.2 F | DIASTOLIC BLOOD PRESSURE: 80 MMHG | OXYGEN SATURATION: 97 %

## 2019-09-30 DIAGNOSIS — E78.5 DYSLIPIDEMIA: ICD-10-CM

## 2019-09-30 DIAGNOSIS — Z23 NEED FOR VACCINATION: ICD-10-CM

## 2019-09-30 DIAGNOSIS — Z12.5 PROSTATE CANCER SCREENING: ICD-10-CM

## 2019-09-30 DIAGNOSIS — Z11.59 NEED FOR HEPATITIS C SCREENING TEST: ICD-10-CM

## 2019-09-30 DIAGNOSIS — R35.1 NOCTURIA: ICD-10-CM

## 2019-09-30 DIAGNOSIS — E55.9 HYPOVITAMINOSIS D: ICD-10-CM

## 2019-09-30 DIAGNOSIS — Z00.00 MEDICARE ANNUAL WELLNESS VISIT, SUBSEQUENT: ICD-10-CM

## 2019-09-30 PROCEDURE — G0439 PPPS, SUBSEQ VISIT: HCPCS | Performed by: INTERNAL MEDICINE

## 2019-09-30 PROCEDURE — 90662 IIV NO PRSV INCREASED AG IM: CPT | Performed by: INTERNAL MEDICINE

## 2019-09-30 PROCEDURE — G0008 ADMIN INFLUENZA VIRUS VAC: HCPCS | Performed by: INTERNAL MEDICINE

## 2019-09-30 RX ORDER — SILDENAFIL 100 MG/1
100 TABLET, FILM COATED ORAL PRN
Qty: 30 TAB | Refills: 5 | Status: SHIPPED | OUTPATIENT
Start: 2019-09-30 | End: 2020-11-19 | Stop reason: SDUPTHER

## 2019-09-30 ASSESSMENT — ENCOUNTER SYMPTOMS: GENERAL WELL-BEING: EXCELLENT

## 2019-09-30 ASSESSMENT — ACTIVITIES OF DAILY LIVING (ADL): BATHING_REQUIRES_ASSISTANCE: 0

## 2019-09-30 ASSESSMENT — PATIENT HEALTH QUESTIONNAIRE - PHQ9: CLINICAL INTERPRETATION OF PHQ2 SCORE: 0

## 2019-09-30 NOTE — PROGRESS NOTES
Chief Complaint   Patient presents with   • Annual Wellness Visit         HPI:  Roland is a 68 y.o. here for Medicare Annual Wellness Visit  Medication, allergies, medical history, surgical history, social history, family history reviewed and updated  SH , walks 3-4 times per week, no tobacco, tries to eat healthy, etoh 2-3 drinks per day, drinks water 60 to 80 oz per day.   No recent eye exam  No vision changes does use reading glasses  Teeth cleaning four times per year   No hearing changes     Patient Active Problem List    Diagnosis Date Noted   • Shoulder pain 05/29/2018   • Impaired fasting glucose 09/14/2013   • S/P appendectomy 09/12/2013   • Preventative health care 09/12/2013   • Schatzki's ring 09/12/2013   • Dyslipidemia 04/09/2013   • Erectile dysfunction 04/09/2013   • History of prostate cancer 04/09/2013   • History of tobacco abuse 04/09/2013     shoulder pain  5/29/18 refer to [facility] phone [phone], fax [phone]  6/7/18 right shoulder x-ray arthritis proceed with physical therapy  6/7/18 [facility] PT note    Schatzki's ring  11/14/12 EGD per [facility], schatzki's ring, gastritis, 57 Amharic dilation     Status post appendectomy     Preventative health  4/19/13 tdap  9/12/13 zoster vaccine  9/26/13 vit d 40  10/27/15 prevnar  12/4/15 audiometry test in [facility] benefit plan  12/4/15 spirometry FEV1 2.9, 72% predicted,[facility] benefit plan  12/4/15 EKG normal sinus rhythm no change,[employer] benefit plan  12/4/15 FOBT pending per [facility] benefit plan  10/17/17 cxr per [facility] plan negative  10/17/17 EKG per [facility] plan negative  10/17/17 spirometry FEV1 2.9,FVC 4.0 per [facility] plan  11/13/17 colon per [facility] diverticulosis, otherwise negative repeat 10 years  5/29/18 shingrix prescription provided to get pharmacy  6/7/18 psa<0.01  9/18/18 pneumovax     Impaired fasting blood sugar  6/7/10 A1c 6.0%, bs 109  12/5/11 A1c 5.8%, bs 146  9/26/13 A1c 6.1%,bs 140  12/5/14 A1c 5.8%,bs 111  12/5/15 A1c  5.9% done at [facility] benefit plan  10/17/17 A1c 5.5% per [facility] plan  6/7/18 A1c 5.5%     History tobacco  Hx of tobacco quit 2010 after smoking 30 yrs     History of prostate cancer  2003 diagnosed with prostate cancer s/p radical prostactomy   4/14/04 psa <0.1  11/5/08 psa <0.1  6/7/10 psa <0.1  12/5/11 psa <0.1  9/26/13 psa <0.01  12/5/14 psa <0.1  12/5/15 psa 0.1 done at [facility] benefit plan  10/17/17 psa<0.1 per [facility] plan  6/7/18 psa<0.01     Dyslipidemia  10/17/04 chol 230,trig 67,hdl 63,ldl 154  11/5/08 chol 243,trig 69,hdl 63,ldl 166  2010 started on niacin 2000 mg daily  6/7/10 chol 234,trig 114,hdl 63,ldl 148  12/5/11 chol 198,trig 96,hdl 57,ldl 122,crp 0.8  9/26/13 chol 249,trig 143,hdl 53,ldl 167,LDL-P 2088,HDL-P 42,LP-IR 70  12/5/14 chol 214,trig 62,hdl 74,ldl 128,crp 0.8  12/5/15 chol 190,trig 62,hdl 64,ldl 114 done at [facility] benefit plan  10/17/17 chol 250,trig 134,hdl 58,ldl 165 per [facility] plan  6/7/18 chol 218,trig 59,hdl 65,ldl 141      Current Outpatient Medications   Medication Sig Dispense Refill   • sildenafil citrate (VIAGRA) 100 MG tablet Take 1 Tab by mouth as needed for Erectile Dysfunction. One per day 30 Tab 5   • Omega-3 Fatty Acids (FISH OIL) 1000 MG Cap capsule Take 1,000 mg by mouth 3 times a day, with meals.     • therapeutic multivitamin-minerals (THERAGRAN-M) Tab Take 1 Tab by mouth every day.     • Calcium Carbonate-Vitamin D (CALCIUM 600 + D PO) Take  by mouth.       No current facility-administered medications for this visit.         Patient is taking medications as noted in medication list.  Current supplements as per medication list.     Allergies: Nkda [no known drug allergy]    Current social contact/activities: Pt spends time fishing, camping, spend time with each other and friends.      Is patient current with immunizations? No, due for FLU and SHINGRIX (Shingles). Patient is interested in receiving FLU today.    He  reports that he has quit smoking. He smoked 1.50 packs per day. He  has never used smokeless tobacco. He reports that he drinks about 12.6 oz of alcohol per week. He reports that he does not use drugs.  Counseling given: Not Answered  Comment: 1 ppd 25 yrs,quit 2011        DPA/Advanced directive: Patient has Advanced Directive, but it is not on file. Instructed to bring in a copy to scan into their chart.    ROS:    Gait: Uses no assistive device    Ostomy: No   Other tubes: No    Amputations: Yes    Chronic oxygen use No    Last eye exam Pt states 3-4 years ago    Wears hearing aids: No    : Reports urinary leakage during the last 6 months that has somewhat interfered with their daily activities or sleep.       Depression Screening    Little interest or pleasure in doing things?  0 - not at all  Feeling down, depressed, or hopeless? 0 - not at all  Patient Health Questionnaire Score: 0    If depressive symptoms identified deferred to follow up visit unless specifically addressed in assessment and plan.    Interpretation of PHQ-9 Total Score   Score Severity   1-4 No Depression   5-9 Mild Depression   10-14 Moderate Depression   15-19 Moderately Severe Depression   20-27 Severe Depression    Screening for Cognitive Impairment    Three Minute Recall (village, kitchen, baby)  2/3    Wally clock face with all 12 numbers and set the hands to show 10 past 10.  Yes 5 after 12  5/5  If cognitive concerns identified, deferred for follow up unless specifically addressed in assessment and plan.    Fall Risk Assessment    Has the patient had two or more falls in the last year or any fall with injury in the last year?  Yes  If fall risk identified, deferred for follow up unless specifically addressed in assessment and plan.    Safety Assessment    Throw rugs on floor.  Yes  Handrails on all stairs.  Yes  Good lighting in all hallways.  Yes  Difficulty hearing.  No  Patient counseled about all safety risks that were identified.    Functional Assessment ADLs    Are there any barriers preventing  you from cooking for yourself or meeting nutritional needs?  No.    Are there any barriers preventing you from driving safely or obtaining transportation?  No.    Are there any barriers preventing you from using a telephone or calling for help?  No.    Are there any barriers preventing you from shopping?  No.    Are there any barriers preventing you from taking care of your own finances?  No.    Are there any barriers preventing you from managing your medications?  No.    Are there any barriers preventing you from showering, bathing or dressing yourself?  No.    Are you currently engaging in any exercise or physical activity?  Yes.  Pt states that him and spouse walk 4 days weekly for 1 1/2 miles  What is your perception of your health?  Excellent.    Health Maintenance Summary                HEPATITIS C SCREENING Overdue 1950     IMM ZOSTER VACCINES Overdue 2/7/2019      Done 12/13/2018 Imm Admin: Recombinant Zoster Vaccine (RZV) (Shingrix)     Patient has more history with this topic...    IMM INFLUENZA Overdue 9/1/2019      Done 9/18/2018 Imm Admin: Influenza Vaccine Adult HD     Patient has more history with this topic...    Annual Wellness Visit Overdue 9/19/2019      Done 9/18/2018 Visit Dx: Medicare annual wellness visit, subsequent     Patient has more history with this topic...    IMM DTaP/Tdap/Td Vaccine Next Due 10/27/2025      Done 10/27/2015 Imm Admin: Tdap Vaccine     Patient has more history with this topic...    COLONOSCOPY Next Due 11/13/2027      Done 11/13/2017 REFERRAL TO GI FOR COLONOSCOPY     Patient has more history with this topic...          Patient Care Team:  Marcell Martinez M.D. as PCP - General (Internal Medicine)    Social History     Tobacco Use   • Smoking status: Former Smoker     Packs/day: 1.50   • Smokeless tobacco: Never Used   • Tobacco comment: 1 ppd 25 yrs,quit 2011   Substance Use Topics   • Alcohol use: Yes     Alcohol/week: 12.6 oz     Types: 21 Glasses of wine per  week     Comment: 3 drinks a day   • Drug use: No     Family History   Problem Relation Age of Onset   • Dementia Mother    • Cancer Father         bladder cancer   • Cancer Sister          lymphatic     He  has a past medical history of Cancer (HCC) (2003), Dental disorder, and ED (erectile dysfunction).   Past Surgical History:   Procedure Laterality Date   • MASS EXCISION ORTHO  3/22/2010    Performed by TODD ORDONEZ at SURGERY SAME DAY [facility] ORS   • PROSTATECTOMY ROBOTIC  2003   • APPENDECTOMY     • TONSILLECTOMY         No chest pain or sob, no palpitations, no falls  Joint pains with activity but no nsaids     Exam:        Hearing and dentition fair  Alert, oriented in no acute distress.  Eye contact is good, speech goal directed, affect calm  Lungs clear   Cv s1s2 reg    Assessment and Plan. The following treatment and monitoring plan is recommended:    Assessment  #! Wellness visit     #2 Preventative health  4/19/13 tdap  9/12/13 zoster vaccine  9/26/13 vit d 40  10/27/15 prevnar  12/4/15 audiometry test in [place] [facility] benefit plan  12/4/15 spirometry FEV1 2.9, 72% predicted,[facility] benefit plan  12/4/15 EKG normal sinus rhythm no change,[facility] benefit plan  12/4/15 FOBT pending per [facility] benefit plan  10/17/17 cxr per [facility] plan negative  10/17/17 EKG per [facility] plan negative  10/17/17 spirometry FEV1 2.9,FVC 4.0 per [facility] plan  11/13/17 colon per [facility] diverticulosis, otherwise negative repeat 10 years  5/29/18 shingrix prescription provided to get pharmacy  6/7/18 psa<0.01  9/18/18 pneumovax     #3 History of prostate cancer 2003 diagnosed with prostate cancer s/p radical prostactomy   6/7/18 psa<0.01     #4 dyslipidemia 6/7/18 chol 218,trig 59,hdl 65,ldl 141    #5 hypovitamin d       Services suggested:    Health Care Screening recommendations as per orders if indicated.  Referrals offered: PT/OT/Nutrition counseling/Behavioral Health/Smoking cessation as per orders if indicated.    Discussion today  about general wellness and lifestyle habits:    · Prevent falls and reduce trip hazards; Cautioned about securing or removing rugs.  · Have a working fire alarm and carbon monoxide detector;   · Engage in regular physical activity and social activities       Follow-up:    Plan   #! Health maintenance reviewed and updated    #2 labs    #3 flu vaccine high-dose    #4 zoster vaccine to be obtained at pharmacy    #5 no need for hearing test or physical therapy    #6 last colonoscopy less than 2 years ago    #7 has had pneumococcal 13, pneumococcal 23, TD AP, first shingles vaccine previously    #8 continue good nutrition, diet, exercise    #9 follow-up 1 year

## 2019-09-30 NOTE — TELEPHONE ENCOUNTER
Sildenafil    Was the patient seen in the last year in this department? Yes    Does patient have an active prescription for medications requested? No     Received Request Via: Patient

## 2019-10-22 ENCOUNTER — HOSPITAL ENCOUNTER (OUTPATIENT)
Dept: LAB | Facility: MEDICAL CENTER | Age: 69
End: 2019-10-22
Attending: INTERNAL MEDICINE
Payer: MEDICARE

## 2019-10-22 DIAGNOSIS — Z11.59 NEED FOR HEPATITIS C SCREENING TEST: ICD-10-CM

## 2019-10-22 DIAGNOSIS — E78.5 DYSLIPIDEMIA: ICD-10-CM

## 2019-10-22 DIAGNOSIS — R35.1 NOCTURIA: ICD-10-CM

## 2019-10-22 DIAGNOSIS — Z12.5 PROSTATE CANCER SCREENING: ICD-10-CM

## 2019-10-22 DIAGNOSIS — E55.9 HYPOVITAMINOSIS D: ICD-10-CM

## 2019-10-22 LAB
25(OH)D3 SERPL-MCNC: 36 NG/ML (ref 30–100)
ALBUMIN SERPL BCP-MCNC: 4.2 G/DL (ref 3.2–4.9)
ALBUMIN/GLOB SERPL: 1.7 G/DL
ALP SERPL-CCNC: 34 U/L (ref 30–99)
ALT SERPL-CCNC: 29 U/L (ref 2–50)
ANION GAP SERPL CALC-SCNC: 5 MMOL/L (ref 0–11.9)
APPEARANCE UR: CLEAR
AST SERPL-CCNC: 32 U/L (ref 12–45)
BASOPHILS # BLD AUTO: 1.1 % (ref 0–1.8)
BASOPHILS # BLD: 0.05 K/UL (ref 0–0.12)
BILIRUB SERPL-MCNC: 1 MG/DL (ref 0.1–1.5)
BILIRUB UR QL STRIP.AUTO: NEGATIVE
BUN SERPL-MCNC: 10 MG/DL (ref 8–22)
CALCIUM SERPL-MCNC: 9.5 MG/DL (ref 8.5–10.5)
CHLORIDE SERPL-SCNC: 102 MMOL/L (ref 96–112)
CHOLEST SERPL-MCNC: 253 MG/DL (ref 100–199)
CO2 SERPL-SCNC: 29 MMOL/L (ref 20–33)
COLOR UR: YELLOW
CREAT SERPL-MCNC: 0.85 MG/DL (ref 0.5–1.4)
EOSINOPHIL # BLD AUTO: 0.08 K/UL (ref 0–0.51)
EOSINOPHIL NFR BLD: 1.7 % (ref 0–6.9)
ERYTHROCYTE [DISTWIDTH] IN BLOOD BY AUTOMATED COUNT: 45 FL (ref 35.9–50)
FASTING STATUS PATIENT QL REPORTED: NORMAL
GLOBULIN SER CALC-MCNC: 2.5 G/DL (ref 1.9–3.5)
GLUCOSE SERPL-MCNC: 114 MG/DL (ref 65–99)
GLUCOSE UR STRIP.AUTO-MCNC: NEGATIVE MG/DL
HCT VFR BLD AUTO: 47.8 % (ref 42–52)
HCV AB SER QL: NEGATIVE
HDLC SERPL-MCNC: 72 MG/DL
HGB BLD-MCNC: 15.5 G/DL (ref 14–18)
IMM GRANULOCYTES # BLD AUTO: 0.01 K/UL (ref 0–0.11)
IMM GRANULOCYTES NFR BLD AUTO: 0.2 % (ref 0–0.9)
KETONES UR STRIP.AUTO-MCNC: NEGATIVE MG/DL
LDLC SERPL CALC-MCNC: 162 MG/DL
LEUKOCYTE ESTERASE UR QL STRIP.AUTO: NEGATIVE
LYMPHOCYTES # BLD AUTO: 1.85 K/UL (ref 1–4.8)
LYMPHOCYTES NFR BLD: 38.9 % (ref 22–41)
MCH RBC QN AUTO: 32 PG (ref 27–33)
MCHC RBC AUTO-ENTMCNC: 32.4 G/DL (ref 33.7–35.3)
MCV RBC AUTO: 98.6 FL (ref 81.4–97.8)
MICRO URNS: NORMAL
MONOCYTES # BLD AUTO: 0.53 K/UL (ref 0–0.85)
MONOCYTES NFR BLD AUTO: 11.1 % (ref 0–13.4)
NEUTROPHILS # BLD AUTO: 2.24 K/UL (ref 1.82–7.42)
NEUTROPHILS NFR BLD: 47 % (ref 44–72)
NITRITE UR QL STRIP.AUTO: NEGATIVE
NRBC # BLD AUTO: 0 K/UL
NRBC BLD-RTO: 0 /100 WBC
PH UR STRIP.AUTO: 8 [PH] (ref 5–8)
PLATELET # BLD AUTO: 220 K/UL (ref 164–446)
PMV BLD AUTO: 10.3 FL (ref 9–12.9)
POTASSIUM SERPL-SCNC: 4.7 MMOL/L (ref 3.6–5.5)
PROT SERPL-MCNC: 6.7 G/DL (ref 6–8.2)
PROT UR QL STRIP: NEGATIVE MG/DL
PSA SERPL-MCNC: <0.01 NG/ML (ref 0–4)
RBC # BLD AUTO: 4.85 M/UL (ref 4.7–6.1)
RBC UR QL AUTO: NEGATIVE
SODIUM SERPL-SCNC: 136 MMOL/L (ref 135–145)
SP GR UR STRIP.AUTO: 1.02
TRIGL SERPL-MCNC: 93 MG/DL (ref 0–149)
TSH SERPL DL<=0.005 MIU/L-ACNC: 5.03 UIU/ML (ref 0.38–5.33)
UROBILINOGEN UR STRIP.AUTO-MCNC: 1 MG/DL
WBC # BLD AUTO: 4.8 K/UL (ref 4.8–10.8)

## 2019-10-22 PROCEDURE — 84443 ASSAY THYROID STIM HORMONE: CPT

## 2019-10-22 PROCEDURE — 80053 COMPREHEN METABOLIC PANEL: CPT

## 2019-10-22 PROCEDURE — 80061 LIPID PANEL: CPT

## 2019-10-22 PROCEDURE — 36415 COLL VENOUS BLD VENIPUNCTURE: CPT

## 2019-10-22 PROCEDURE — 86803 HEPATITIS C AB TEST: CPT

## 2019-10-22 PROCEDURE — 85025 COMPLETE CBC W/AUTO DIFF WBC: CPT

## 2019-10-22 PROCEDURE — 82306 VITAMIN D 25 HYDROXY: CPT

## 2019-10-22 PROCEDURE — 81003 URINALYSIS AUTO W/O SCOPE: CPT

## 2019-10-22 PROCEDURE — 84153 ASSAY OF PSA TOTAL: CPT | Mod: GA

## 2019-10-23 ENCOUNTER — TELEPHONE (OUTPATIENT)
Dept: MEDICAL GROUP | Facility: MEDICAL CENTER | Age: 69
End: 2019-10-23

## 2019-10-24 ENCOUNTER — TELEPHONE (OUTPATIENT)
Dept: MEDICAL GROUP | Facility: MEDICAL CENTER | Age: 69
End: 2019-10-24

## 2019-10-24 NOTE — TELEPHONE ENCOUNTER
----- Message from Marcell Martinez M.D. sent at 10/23/2019  6:06 PM PDT -----  Called patient again and left message.  Please let him know that his test shows:  (1) normal liver function, kidney function, thyroid function, vitamin D level and prostate cancer blood test  (2) his blood sugar is slightly high at 114, goal is 100 or less, diabetic ranges above 126, he does not have diabetes.  Have him continue work on limiting sweets and candies and monitor his carbohydrate portion serving sizes  (3) his cholesterol did increase from a year ago, his total cholesterol is now 253 previously it was 218, his good cholesterol is 72 (goal is above 40) however his bad cholesterol has increased to 162 (goal is 100 or less).  He would benefit from a cholesterol medication to decrease his risk of heart disease.  If he is interested please let me know

## 2019-10-24 NOTE — TELEPHONE ENCOUNTER
Called patient again and left message.  Please let him know that his test shows:  (1) normal liver function, kidney function, thyroid function, vitamin D level and prostate cancer blood test  (2) his blood sugar is slightly high at 114, goal is 100 or less, diabetic ranges above 126, he does not have diabetes.  Have him continue work on limiting sweets and candies and monitor his carbohydrate portion serving sizes  (3) his cholesterol did increase from a year ago, his total cholesterol is now 253 previously it was 218, his good cholesterol is 72 (goal is above 40) however his bad cholesterol has increased to 162 (goal is 100 or less).  He would benefit from a cholesterol medication to decrease his risk of heart disease.  If he is interested please let me know

## 2020-02-03 ENCOUNTER — OFFICE VISIT (OUTPATIENT)
Dept: MEDICAL GROUP | Facility: MEDICAL CENTER | Age: 70
End: 2020-02-03
Payer: MEDICARE

## 2020-02-03 VITALS
BODY MASS INDEX: 27.1 KG/M2 | WEIGHT: 218 LBS | TEMPERATURE: 97.1 F | DIASTOLIC BLOOD PRESSURE: 84 MMHG | OXYGEN SATURATION: 99 % | SYSTOLIC BLOOD PRESSURE: 142 MMHG | HEART RATE: 78 BPM | HEIGHT: 75 IN

## 2020-02-03 DIAGNOSIS — Z00.00 PREVENTATIVE HEALTH CARE: Chronic | ICD-10-CM

## 2020-02-03 DIAGNOSIS — R09.A2 GLOBUS SYNDROME: ICD-10-CM

## 2020-02-03 PROCEDURE — 99213 OFFICE O/P EST LOW 20 MIN: CPT | Performed by: INTERNAL MEDICINE

## 2020-02-03 ASSESSMENT — PATIENT HEALTH QUESTIONNAIRE - PHQ9: CLINICAL INTERPRETATION OF PHQ2 SCORE: 0

## 2020-02-03 NOTE — PROGRESS NOTES
Subjective:      abraham Hernández is a 69 y.o. male who presents with Follow-Up (throat issues)            HPI       Has had 5 to 7 days muscle soreness in am throat area, but gets better the rest of the day, no difficulty with swallowing, no hoarseness, described as neck pressure, no radiation, no URI, no significant cough, no fever or chills, no sinus pressure or post nasal drip, no ear pain or pressure, no swollen glands in neck, does not feel sick. Has had some gerd symptoms this am, no gerd otherwise. No regular nsaids, no ppi. No throat discomfort with exercise, walks 3 miles 4 times per week.   No tobacco or chewing tobacco  etoh 2-3 glasses of wine at night         Current Outpatient Medications   Medication Sig Dispense Refill   • sildenafil citrate (VIAGRA) 100 MG tablet Take 1 Tab by mouth as needed for Erectile Dysfunction. One per day 30 Tab 5   • Omega-3 Fatty Acids (FISH OIL) 1000 MG Cap capsule Take 1,000 mg by mouth 3 times a day, with meals.     • therapeutic multivitamin-minerals (THERAGRAN-M) Tab Take 1 Tab by mouth every day.     • Calcium Carbonate-Vitamin D (CALCIUM 600 + D PO) Take  by mouth.       No current facility-administered medications for this visit.      Patient Active Problem List   Diagnosis   • Dyslipidemia   • Erectile dysfunction   • History of prostate cancer   • History of tobacco abuse   • S/P appendectomy   • Preventative health care   • Schatzki's ring   • Impaired fasting glucose   • Shoulder pain     Depression Screening    Little interest or pleasure in doing things?  0 - not at all  Feeling down, depressed , or hopeless? 0 - not at all  Patient Health Questionnaire Score: 0         Health Maintenance Summary                Annual Wellness Visit Next Due 9/30/2020      Done 9/30/2019 Visit Dx: Medicare annual wellness visit, subsequent     Patient has more history with this topic...    IMM DTaP/Tdap/Td Vaccine Next Due 10/27/2025      Done 10/27/2015 Imm Admin:  "Tdap Vaccine     Patient has more history with this topic...    COLONOSCOPY Next Due 11/13/2027      Done 11/13/2017 REFERRAL TO GI FOR COLONOSCOPY     Patient has more history with this topic...          Patient Care Team:  Marcell Martinez M.D. as PCP - General (Internal Medicine)      ROS       Objective:     /84 (BP Location: Right arm, Patient Position: Sitting, BP Cuff Size: Adult)   Pulse 78   Temp 36.2 °C (97.1 °F)   Ht 1.905 m (6' 3\")   Wt 98.9 kg (218 lb)   SpO2 99%   BMI 27.25 kg/m²      Physical Exam  Vitals signs reviewed.   Constitutional:       Appearance: Normal appearance.   HENT:      Head: Normocephalic and atraumatic.      Right Ear: Tympanic membrane and external ear normal.      Left Ear: Tympanic membrane normal.      Nose: No congestion.      Mouth/Throat:      Pharynx: No oropharyngeal exudate.   Eyes:      General:         Right eye: No discharge.         Left eye: No discharge.      Conjunctiva/sclera: Conjunctivae normal.   Neck:      Musculoskeletal: Neck supple. No neck rigidity or muscular tenderness.   Cardiovascular:      Rate and Rhythm: Normal rate and regular rhythm.      Heart sounds: Normal heart sounds.   Abdominal:      General: There is no distension.   Lymphadenopathy:      Cervical: No cervical adenopathy.   Skin:     General: Skin is warm.   Neurological:      General: No focal deficit present.      Mental Status: He is alert.   Psychiatric:         Mood and Affect: Mood normal.         Behavior: Behavior normal.                 Assessment/Plan:       Assessment  #!  Globus syndrome, throat discomfort in the morning but resolves later in the day, has had 1 episode of reflux, consider atypical reflux causing symptoms, no adenopathy, no recent URI, no postnasal drip, no dysphagia, no chewing tobacco or smoking        Plan  #1  Ultrasound soft tissue neck    #2 ENT referral    #3 trial of Prilosec 20 mg in the evening    #4 follow-up after test completed    #5 " notify us if worsening symptoms

## 2020-02-10 ENCOUNTER — HOSPITAL ENCOUNTER (OUTPATIENT)
Dept: RADIOLOGY | Facility: MEDICAL CENTER | Age: 70
End: 2020-02-10
Attending: INTERNAL MEDICINE
Payer: MEDICARE

## 2020-02-10 DIAGNOSIS — R09.A2 GLOBUS SYNDROME: ICD-10-CM

## 2020-02-10 PROBLEM — E04.1 THYROID NODULE: Status: ACTIVE | Noted: 2020-02-10

## 2020-02-10 PROCEDURE — 76536 US EXAM OF HEAD AND NECK: CPT

## 2020-03-14 ENCOUNTER — HOSPITAL ENCOUNTER (OUTPATIENT)
Dept: LAB | Facility: MEDICAL CENTER | Age: 70
End: 2020-03-14
Attending: OTOLARYNGOLOGY
Payer: MEDICARE

## 2020-03-14 LAB
BUN SERPL-MCNC: 9 MG/DL (ref 8–22)
CREAT SERPL-MCNC: 0.83 MG/DL (ref 0.5–1.4)

## 2020-03-14 PROCEDURE — 36415 COLL VENOUS BLD VENIPUNCTURE: CPT

## 2020-03-14 PROCEDURE — 82565 ASSAY OF CREATININE: CPT

## 2020-03-14 PROCEDURE — 84520 ASSAY OF UREA NITROGEN: CPT

## 2020-03-18 ENCOUNTER — HOSPITAL ENCOUNTER (OUTPATIENT)
Dept: RADIOLOGY | Facility: MEDICAL CENTER | Age: 70
End: 2020-03-18
Attending: INTERNAL MEDICINE
Payer: MEDICARE

## 2020-03-18 DIAGNOSIS — R22.1 NECK MASS: ICD-10-CM

## 2020-03-18 PROCEDURE — 70491 CT SOFT TISSUE NECK W/DYE: CPT

## 2020-03-18 PROCEDURE — 700117 HCHG RX CONTRAST REV CODE 255: Performed by: INTERNAL MEDICINE

## 2020-03-18 RX ADMIN — IOHEXOL 80 ML: 350 INJECTION, SOLUTION INTRAVENOUS at 15:42

## 2020-11-19 ENCOUNTER — OFFICE VISIT (OUTPATIENT)
Dept: MEDICAL GROUP | Facility: MEDICAL CENTER | Age: 70
End: 2020-11-19
Payer: MEDICARE

## 2020-11-19 VITALS
HEART RATE: 83 BPM | SYSTOLIC BLOOD PRESSURE: 124 MMHG | DIASTOLIC BLOOD PRESSURE: 66 MMHG | OXYGEN SATURATION: 94 % | BODY MASS INDEX: 27.1 KG/M2 | TEMPERATURE: 97.4 F | WEIGHT: 218 LBS | HEIGHT: 75 IN

## 2020-11-19 DIAGNOSIS — N52.9 ERECTILE DYSFUNCTION, UNSPECIFIED ERECTILE DYSFUNCTION TYPE: ICD-10-CM

## 2020-11-19 DIAGNOSIS — E55.9 VITAMIN D DEFICIENCY: ICD-10-CM

## 2020-11-19 DIAGNOSIS — E78.5 DYSLIPIDEMIA: Chronic | ICD-10-CM

## 2020-11-19 DIAGNOSIS — Z00.00 MEDICARE ANNUAL WELLNESS VISIT, SUBSEQUENT: ICD-10-CM

## 2020-11-19 DIAGNOSIS — Z12.5 PROSTATE CANCER SCREENING: ICD-10-CM

## 2020-11-19 DIAGNOSIS — E04.1 THYROID NODULE: ICD-10-CM

## 2020-11-19 DIAGNOSIS — G47.00 INSOMNIA, UNSPECIFIED TYPE: ICD-10-CM

## 2020-11-19 PROCEDURE — G0439 PPPS, SUBSEQ VISIT: HCPCS | Performed by: INTERNAL MEDICINE

## 2020-11-19 RX ORDER — ZOLPIDEM TARTRATE 10 MG/1
10 TABLET ORAL NIGHTLY PRN
Qty: 30 TAB | Refills: 2 | Status: SHIPPED | OUTPATIENT
Start: 2020-11-19 | End: 2021-06-09

## 2020-11-19 RX ORDER — SILDENAFIL 100 MG/1
100 TABLET, FILM COATED ORAL PRN
Qty: 30 TAB | Refills: 5 | Status: SHIPPED | OUTPATIENT
Start: 2020-11-19 | End: 2021-11-02 | Stop reason: SDUPTHER

## 2020-11-19 ASSESSMENT — PATIENT HEALTH QUESTIONNAIRE - PHQ9: CLINICAL INTERPRETATION OF PHQ2 SCORE: 0

## 2020-11-19 ASSESSMENT — ACTIVITIES OF DAILY LIVING (ADL): BATHING_REQUIRES_ASSISTANCE: 0

## 2020-11-19 ASSESSMENT — FIBROSIS 4 INDEX: FIB4 SCORE: 1.94

## 2020-11-19 ASSESSMENT — ENCOUNTER SYMPTOMS: GENERAL WELL-BEING: EXCELLENT

## 2020-11-19 NOTE — PROGRESS NOTES
Subjective:      abraham Hernández is a 70 y.o. male who presents with medicare wellness        HPI       CC:  Health Risk Assessment Exam.    HPI: wellness visit   Roland is a 70 y.o. here for Health Risk Assessment.     PCP: Marcell Martinez M.D.  Medication, allergies, medical history, surgical history, social history reviewed and updated  SH lives with wife and practicing social distancing and mask wearing, avoiding crowds and going out, walking 1.5 miles 20 minutes four times per week, tries to eat healthy and avoids sweets   No difficulty with driving, does use reading glasses   Hearing no changes significant   Teeth cleaning every three months   Eye exam annually   Coffee in am  Has had more insomnia, goes to sleep at 10 am, one to two glasses wine nightly, will wake up in the middle of the night and not able to fall back to sleep, worries about two children, will get up at 4 am to use the bathroom and be unable to fall back to sleep, has had this during the covid, also able to meditate, has tried melatonin in the past no benefit, has tried ambien with benefit   No chest pain, shortness of breath activity  No joint pain knees, hips, back with walking, no regular NSAIDs  No depression, no memory loss  Good social support with family  Erectile dysfunction Viagra without side effects       Patient Active Problem List    Diagnosis Date Noted   • Thyroid nodule 02/10/2020   • Shoulder pain 05/29/2018   • Impaired fasting glucose 09/14/2013   • S/P appendectomy 09/12/2013   • Preventative health care 09/12/2013   • Schatzki's ring 09/12/2013   • Dyslipidemia 04/09/2013   • Erectile dysfunction 04/09/2013   • History of prostate cancer 04/09/2013   • History of tobacco abuse 04/09/2013        Current Outpatient Medications   Medication Sig Dispense Refill   • sildenafil citrate (VIAGRA) 100 MG tablet Take 1 Tab by mouth as needed for Erectile Dysfunction. One per day 30 Tab 5   • Omega-3 Fatty Acids (FISH OIL)  1000 MG Cap capsule Take 1,000 mg by mouth 3 times a day, with meals.     • therapeutic multivitamin-minerals (THERAGRAN-M) Tab Take 1 Tab by mouth every day.     • Calcium Carbonate-Vitamin D (CALCIUM 600 + D PO) Take  by mouth.       No current facility-administered medications for this visit.       Current supplements: reviewed  Chronic narcotic pain medicines: no  Allergies: Nkda [no known drug allergy]    Screening:reviewed  Depressive Symptoms: Denies feeling down, depressed or hopeless. Denies loss of interest or pleasure in doing things   ADLs: Denies needing help with using telephone, transportation, shopping, preparing meals, housework, laundry, or managing medication or money.    Independent with bathing, hygiene, feeding, toileting, dressing    Memory concerns: Denies difficulty remembering details of conversations, events and upcoming appointments.  Hearing problems: Denies.   Recent falls no    Current social contact/activities: reviewed  Social History     Tobacco Use   • Smoking status: Former Smoker     Packs/day: 1.50   • Smokeless tobacco: Never Used   • Tobacco comment: 1 ppd 25 yrs,quit 2011   Substance Use Topics   • Alcohol use: Yes     Alcohol/week: 8.4 - 12.6 oz     Types: 14 - 21 Glasses of wine per week     Comment: wine 2-3 drinks per day    • Drug use: No       Family History   Problem Relation Age of Onset   • Dementia Mother    • Cancer Father         bladder cancer   • Cancer Sister          lymphatic     Past Surgical History:   Procedure Laterality Date   • MASS EXCISION ORTHO  3/22/2010    Performed by TODD ORDONEZ at SURGERY SAME DAY [facility] ORS   • PROSTATECTOMY ROBOTIC  2003   • APPENDECTOMY     • TONSILLECTOMY            Ostomy or other tubes or amputations: no  Chronic oxygen use no  Gait: normal. Uses assistive device : no               Current Outpatient Medications   Medication Sig Dispense Refill   • sildenafil citrate (VIAGRA) 100 MG tablet Take 1 Tab by mouth as  needed for Erectile Dysfunction. One per day 30 Tab 5   • Omega-3 Fatty Acids (FISH OIL) 1000 MG Cap capsule Take 1,000 mg by mouth 3 times a day, with meals.     • therapeutic multivitamin-minerals (THERAGRAN-M) Tab Take 1 Tab by mouth every day.     • Calcium Carbonate-Vitamin D (CALCIUM 600 + D PO) Take  by mouth.       No current facility-administered medications for this visit.      thyroid nodule  2/10/20 ultrasound thyroid left nodule 1.1 x 0.9 x 1.0 cm TIRADS 6, repeat 1 year ultrasound    shoulder pain  5/29/18 refer to sports performance phone [phone], fax [phone]  6/7/18 right shoulder x-ray arthritis proceed with physical therapy  6/7/18 [facility] PT note     Schatzki's ring  11/14/12 EGD per [facility], schatzki's ring, gastritis, 57 [place] dilation     Status post appendectomy     Preventative health  4/19/13 tdap  9/12/13 zoster vaccine  10/27/15 prevnar  12/4/15 audiometry test in [facility] benefit plan  12/4/15 spirometry FEV1 2.9, 72% predicted,[facility] benefit plan  12/4/15 EKG normal sinus rhythm no change,[facility] benefit plan  12/4/15 FOBT pending per [facility] benefit plan  10/17/17 cxr per [facility] plan negative  10/17/17 EKG per [facility] plan negative  10/17/17 spirometry FEV1 2.9,FVC 4.0 per [facility] plan  11/13/17 colon per [facility] diverticulosis, otherwise negative repeat 10 years  9/18/18 pneumovax  9/30/19 shingrix second in series   10/22/19 psa<0.01  10/22/19 vit d 36  10/22/19 hep c ab negative     Impaired fasting blood sugar  6/7/10 A1c 6.0%, bs 109  12/5/11 A1c 5.8%, bs 146  9/26/13 A1c 6.1%,bs 140  12/5/14 A1c 5.8%,bs 111  12/5/15 A1c 5.9% done at [facility] benefit plan  10/17/17 A1c 5.5% per [facility] plan  6/7/18 A1c 5.5%  10/22/19 bs 114     History tobacco  Hx of tobacco quit 2010 after smoking 30 yrs     History of prostate cancer  2003 diagnosed with prostate cancer s/p radical prostactomy   4/14/04 psa <0.1  11/5/08 psa <0.1  6/7/10 psa <0.1  12/5/11 psa <0.1  9/26/13 psa <0.01  12/5/14 psa  <0.1  12/5/15 psa 0.1 done at [facility] benefit plan  10/17/17 psa<0.1 per [facility] plan  6/7/18 psa<0.01  10/22/19 psa<0.01     Dyslipidemia  10/17/04 chol 230,trig 67,hdl 63,ldl 154  11/5/08 chol 243,trig 69,hdl 63,ldl 166  2010 started on niacin 2000 mg daily  6/7/10 chol 234,trig 114,hdl 63,ldl 148  12/5/11 chol 198,trig 96,hdl 57,ldl 122,crp 0.8  9/26/13 chol 249,trig 143,hdl 53,ldl 167,LDL-P 2088,HDL-P 42,LP-IR 70  12/5/14 chol 214,trig 62,hdl 74,ldl 128,crp 0.8  12/5/15 chol 190,trig 62,hdl 64,ldl 114 done at [facility] benefit plan  10/17/17 chol 250,trig 134,hdl 58,ldl 165 per [facility]  6/7/18 chol 218,trig 59,hdl 65,ldl 141  10/22/19 chol 253,trig 93,hdl 72,ldl 162 offered statin  10/29/19 patient declines statin         Patient Active Problem List   Diagnosis   • Dyslipidemia   • Erectile dysfunction   • History of prostate cancer   • History of tobacco abuse   • S/P appendectomy   • Preventative health care   • Schatzki's ring   • Impaired fasting glucose   • Shoulder pain   • Thyroid nodule       Depression Screening    Little interest or pleasure in doing things?  0 - not at all  Feeling down, depressed , or hopeless? 0 - not at all  Patient Health Questionnaire Score: 0     If depressive symptoms identified deferred to follow up visit unless specifically addressed in assessment and plan.    Interpretation of PHQ-9 Total Score   Score Severity   1-4 No Depression   5-9 Mild Depression   10-14 Moderate Depression   15-19 Moderately Severe Depression   20-27 Severe Depression    Screening for Cognitive Impairment    Three Minute Recall (river, parrish, finger) 2/3    Wally clock face with all 12 numbers and set the hands to show 10 past 11.  Yes    Cognitive concerns identified deferred for follow up unless specifically addressed in assessment and plan.    Fall Risk Assessment    Has the patient had two or more falls in the last year or any fall with injury in the last year?  No    Safety Assessment    Throw rugs on  floor.  Yes  Handrails on all stairs.  Yes  Good lighting in all hallways.  Yes  Difficulty hearing.  Yes  Patient counseled about all safety risks that were identified.    Functional Assessment ADLs    Are there any barriers preventing you from cooking for yourself or meeting nutritional needs?  No.    Are there any barriers preventing you from driving safely or obtaining transportation?  No.    Are there any barriers preventing you from using a telephone or calling for help?  No.    Are there any barriers preventing you from shopping?  No.    Are there any barriers preventing you from taking care of your own finances?  No.    Are there any barriers preventing you from managing your medications?  No.    Are there any barriers preventing you from showering, bathing or dressing yourself?  No.    Are you currently engaging in any exercise or physical activity?  Yes.     What is your perception of your health?  Excellent.      Health Maintenance Summary                Annual Wellness Visit Overdue 9/30/2020      Done 9/30/2019 Visit Dx: Medicare annual wellness visit, subsequent     Patient has more history with this topic...    IMM DTaP/Tdap/Td Vaccine Next Due 10/27/2025      Done 10/27/2015 Imm Admin: Tdap Vaccine     Patient has more history with this topic...    COLONOSCOPY Next Due 11/13/2027      Done 11/13/2017 REFERRAL TO GI FOR COLONOSCOPY     Patient has more history with this topic...          Patient Care Team:  Marcell Martinez M.D. as PCP - General (Internal Medicine)    ROS       Objective:          Physical Exam  Vitals signs and nursing note reviewed.   Constitutional:       Appearance: Normal appearance.   HENT:      Head: Normocephalic and atraumatic.      Right Ear: External ear normal.   Eyes:      Conjunctiva/sclera: Conjunctivae normal.   Neck:      Musculoskeletal: Neck supple.   Cardiovascular:      Rate and Rhythm: Normal rate and regular rhythm.      Heart sounds: Normal heart sounds. No  murmur.   Pulmonary:      Effort: No respiratory distress.      Breath sounds: Normal breath sounds.   Abdominal:      General: There is no distension.   Neurological:      General: No focal deficit present.      Mental Status: He is alert.   Psychiatric:         Behavior: Behavior normal.                 Assessment/Plan:        Assessment  #!  Medicare wellness visit    #2 history of prostate cancer status post prostatectomy 2003, last PSA a year ago undetectable    #3 dyslipidemia 10/22/19 chol 253,trig 93,hdl 72,ldl 162 offered statin    #4 erectile dysfunction on Viagra    #5 insomnia difficulty with sleep maintenance, no depression, has tried Ambien previously with benefit, melatonin not effective, does relaxation therapy, meditation, get regular exercise    #6 preventive health  4/19/13 tdap  9/12/13 zoster vaccine  10/27/15 prevnar  12/4/15 audiometry test in [facility] benefit plan  12/4/15 spirometry FEV1 2.9, 72% predicted,[facility] benefit plan  12/4/15 EKG normal sinus rhythm no change,[facility] benefit plan  12/4/15 FOBT pending per [facility] benefit plan  10/17/17 cxr per [facility] plan negative  10/17/17 EKG per [facility] plan negative  10/17/17 spirometry FEV1 2.9,FVC 4.0 per [facility] plan  11/13/17 colon per [facility] diverticulosis, otherwise negative repeat 10 years  9/18/18 pneumovax  9/30/19 shingrix second in series   10/22/19 psa<0.01  10/22/19 vit d 36  10/22/19 hep c ab negative    #7 thyroid nodule 2/10/20 ultrasound thyroid left nodule 1.1 x 0.9 x 1.0 cm TIRADS 6, repeat 1 year ultrasound    #8 vitamin D deficiency    Plan  #1 health maintenance reviewed and updated    #2 has had influenza vaccine, up-to-date on other vaccines    #3 Next colonoscopy 2027, last colonoscopy 3 years ago    #4 nutrition, diet, exercise discussed    #5 sleep hygiene discussed    #6 continue regular exercise, minimize caffeine and alcohol, regular meditation    #7 refill Ambien, has had previously, long-term use can cause habituation,  dependence, limit to 1-2 times per week, long-term use can also cause memory loss, he understands and agrees    #8 continue Covid 19 precautions, social distancing, mask wearing, avoid large crowds and family gatherings    #9 repeat thyroid ultrasound next year February or March, order placed    #10 follow-up 1 year    #11 labs      Health Care Screening recommendations reviewed with patient today and updated or ordered.  DPA/Advanced directive: Completed/Information provided.   Referrals for PT/OT/Nutrition counseling/Behavioral Health/Smoking cessation as above if indicated  Discussion today about general wellness and lifestyle habits:    · Prevent falls and reduce trip hazards;   · Have a working fire alarm and carbon monoxide detector;   · Engage in regular physical activity and social activities;   Use sun protection when outdoors.

## 2020-12-02 ENCOUNTER — HOSPITAL ENCOUNTER (OUTPATIENT)
Dept: LAB | Facility: MEDICAL CENTER | Age: 70
End: 2020-12-02
Attending: INTERNAL MEDICINE
Payer: MEDICARE

## 2020-12-02 DIAGNOSIS — Z12.5 PROSTATE CANCER SCREENING: ICD-10-CM

## 2020-12-02 DIAGNOSIS — E55.9 VITAMIN D DEFICIENCY: ICD-10-CM

## 2020-12-02 DIAGNOSIS — E78.5 DYSLIPIDEMIA: Chronic | ICD-10-CM

## 2020-12-02 LAB
ALBUMIN SERPL BCP-MCNC: 4 G/DL (ref 3.2–4.9)
ALBUMIN/GLOB SERPL: 1.4 G/DL
ALP SERPL-CCNC: 39 U/L (ref 30–99)
ALT SERPL-CCNC: 18 U/L (ref 2–50)
ANION GAP SERPL CALC-SCNC: 10 MMOL/L (ref 7–16)
AST SERPL-CCNC: 28 U/L (ref 12–45)
BASOPHILS # BLD AUTO: 0.9 % (ref 0–1.8)
BASOPHILS # BLD: 0.05 K/UL (ref 0–0.12)
BILIRUB SERPL-MCNC: 0.6 MG/DL (ref 0.1–1.5)
BUN SERPL-MCNC: 8 MG/DL (ref 8–22)
CALCIUM SERPL-MCNC: 9.2 MG/DL (ref 8.4–10.2)
CHLORIDE SERPL-SCNC: 96 MMOL/L (ref 96–112)
CHOLEST SERPL-MCNC: 240 MG/DL (ref 100–199)
CO2 SERPL-SCNC: 26 MMOL/L (ref 20–33)
CREAT SERPL-MCNC: 0.72 MG/DL (ref 0.5–1.4)
EOSINOPHIL # BLD AUTO: 0.17 K/UL (ref 0–0.51)
EOSINOPHIL NFR BLD: 3.1 % (ref 0–6.9)
ERYTHROCYTE [DISTWIDTH] IN BLOOD BY AUTOMATED COUNT: 43.2 FL (ref 35.9–50)
FASTING STATUS PATIENT QL REPORTED: NORMAL
GLOBULIN SER CALC-MCNC: 2.8 G/DL (ref 1.9–3.5)
GLUCOSE SERPL-MCNC: 126 MG/DL (ref 65–99)
HCT VFR BLD AUTO: 45.5 % (ref 42–52)
HDLC SERPL-MCNC: 66 MG/DL
HGB BLD-MCNC: 15 G/DL (ref 14–18)
IMM GRANULOCYTES # BLD AUTO: 0.02 K/UL (ref 0–0.11)
IMM GRANULOCYTES NFR BLD AUTO: 0.4 % (ref 0–0.9)
LDLC SERPL CALC-MCNC: 154 MG/DL
LYMPHOCYTES # BLD AUTO: 1.94 K/UL (ref 1–4.8)
LYMPHOCYTES NFR BLD: 35.3 % (ref 22–41)
MCH RBC QN AUTO: 32.1 PG (ref 27–33)
MCHC RBC AUTO-ENTMCNC: 33 G/DL (ref 33.7–35.3)
MCV RBC AUTO: 97.2 FL (ref 81.4–97.8)
MONOCYTES # BLD AUTO: 0.56 K/UL (ref 0–0.85)
MONOCYTES NFR BLD AUTO: 10.2 % (ref 0–13.4)
NEUTROPHILS # BLD AUTO: 2.75 K/UL (ref 1.82–7.42)
NEUTROPHILS NFR BLD: 50.1 % (ref 44–72)
NRBC # BLD AUTO: 0 K/UL
NRBC BLD-RTO: 0 /100 WBC
PLATELET # BLD AUTO: 252 K/UL (ref 164–446)
PMV BLD AUTO: 9 FL (ref 9–12.9)
POTASSIUM SERPL-SCNC: 5 MMOL/L (ref 3.6–5.5)
PROT SERPL-MCNC: 6.8 G/DL (ref 6–8.2)
RBC # BLD AUTO: 4.68 M/UL (ref 4.7–6.1)
SODIUM SERPL-SCNC: 132 MMOL/L (ref 135–145)
TRIGL SERPL-MCNC: 101 MG/DL (ref 0–149)
TSH SERPL DL<=0.005 MIU/L-ACNC: 2.89 UIU/ML (ref 0.38–5.33)
WBC # BLD AUTO: 5.5 K/UL (ref 4.8–10.8)

## 2020-12-02 PROCEDURE — 80053 COMPREHEN METABOLIC PANEL: CPT

## 2020-12-02 PROCEDURE — 84153 ASSAY OF PSA TOTAL: CPT | Mod: GA

## 2020-12-02 PROCEDURE — 80061 LIPID PANEL: CPT

## 2020-12-02 PROCEDURE — 36415 COLL VENOUS BLD VENIPUNCTURE: CPT

## 2020-12-02 PROCEDURE — 82306 VITAMIN D 25 HYDROXY: CPT

## 2020-12-02 PROCEDURE — 84443 ASSAY THYROID STIM HORMONE: CPT

## 2020-12-02 PROCEDURE — 83036 HEMOGLOBIN GLYCOSYLATED A1C: CPT

## 2020-12-02 PROCEDURE — 85025 COMPLETE CBC W/AUTO DIFF WBC: CPT

## 2020-12-03 ENCOUNTER — TELEPHONE (OUTPATIENT)
Dept: MEDICAL GROUP | Facility: MEDICAL CENTER | Age: 70
End: 2020-12-03

## 2020-12-03 DIAGNOSIS — R73.01 IMPAIRED FASTING GLUCOSE: Chronic | ICD-10-CM

## 2020-12-03 DIAGNOSIS — Z91.89 OTHER SPECIFIED PERSONAL RISK FACTORS, NOT ELSEWHERE CLASSIFIED: ICD-10-CM

## 2020-12-03 LAB
25(OH)D3 SERPL-MCNC: 40 NG/ML (ref 30–100)
PSA SERPL-MCNC: <0.02 NG/ML (ref 0–4)

## 2020-12-04 LAB
EST. AVERAGE GLUCOSE BLD GHB EST-MCNC: 114 MG/DL
HBA1C MFR BLD: 5.6 % (ref 0–5.6)

## 2020-12-04 NOTE — TELEPHONE ENCOUNTER
Notified with labs, blood sugar elevated, called lab will add A1c, cholesterol elevated, has declined statin therapy previously, 10-year risk 19%, recommend CT cardiac calcium to restratify, he understands and agrees, there may be an out-of-pocket cost of $100, we will see if we can get this covered, otherwise continue with good nutrition, diet, exercise,

## 2020-12-14 ENCOUNTER — HOSPITAL ENCOUNTER (OUTPATIENT)
Dept: RADIOLOGY | Facility: MEDICAL CENTER | Age: 70
End: 2020-12-14
Attending: INTERNAL MEDICINE
Payer: COMMERCIAL

## 2020-12-17 ENCOUNTER — TELEPHONE (OUTPATIENT)
Dept: MEDICAL GROUP | Facility: MEDICAL CENTER | Age: 70
End: 2020-12-17

## 2020-12-17 ENCOUNTER — HOSPITAL ENCOUNTER (OUTPATIENT)
Dept: RADIOLOGY | Facility: MEDICAL CENTER | Age: 70
End: 2020-12-17
Attending: INTERNAL MEDICINE
Payer: MEDICARE

## 2020-12-17 DIAGNOSIS — Z91.89 OTHER SPECIFIED PERSONAL RISK FACTORS, NOT ELSEWHERE CLASSIFIED: ICD-10-CM

## 2020-12-17 DIAGNOSIS — E78.5 DYSLIPIDEMIA: Chronic | ICD-10-CM

## 2020-12-17 PROCEDURE — 4410556 CT-CARDIAC SCORING (SELF PAY ONLY)

## 2020-12-17 RX ORDER — ATORVASTATIN CALCIUM 40 MG/1
40 TABLET, FILM COATED ORAL DAILY
Qty: 30 TAB | Refills: 2 | Status: SHIPPED | OUTPATIENT
Start: 2020-12-17 | End: 2021-02-11 | Stop reason: SDUPTHER

## 2020-12-18 NOTE — TELEPHONE ENCOUNTER
Notified with results, CT calcium score elevated, dyslipidemia, recommend start statin therapy Lipitor 40 mg, can cause muscle pain, joint pains, muscle aches, repeat labs 4 weeks, notify me if side effects, continue work on good nutrition, exercise.  Prescription to [facility], labs placed in system.

## 2021-01-15 DIAGNOSIS — Z23 NEED FOR VACCINATION: ICD-10-CM

## 2021-02-06 ENCOUNTER — IMMUNIZATION (OUTPATIENT)
Dept: FAMILY PLANNING/WOMEN'S HEALTH CLINIC | Facility: IMMUNIZATION CENTER | Age: 71
End: 2021-02-06
Attending: INTERNAL MEDICINE
Payer: MEDICARE

## 2021-02-06 DIAGNOSIS — Z23 NEED FOR VACCINATION: ICD-10-CM

## 2021-02-06 DIAGNOSIS — Z23 ENCOUNTER FOR VACCINATION: Primary | ICD-10-CM

## 2021-02-11 ENCOUNTER — TELEPHONE (OUTPATIENT)
Dept: MEDICAL GROUP | Facility: MEDICAL CENTER | Age: 71
End: 2021-02-11

## 2021-02-11 DIAGNOSIS — E78.5 DYSLIPIDEMIA: Chronic | ICD-10-CM

## 2021-02-11 RX ORDER — ATORVASTATIN CALCIUM 40 MG/1
40 TABLET, FILM COATED ORAL DAILY
Qty: 30 TABLET | Refills: 2 | Status: SHIPPED | OUTPATIENT
Start: 2021-02-11 | End: 2021-02-15 | Stop reason: SDUPTHER

## 2021-02-11 NOTE — TELEPHONE ENCOUNTER
Please call the patient, remind him that he is due for his repeat cholesterol blood test, those orders are in the computer system.

## 2021-02-15 ENCOUNTER — TELEPHONE (OUTPATIENT)
Dept: MEDICAL GROUP | Facility: MEDICAL CENTER | Age: 71
End: 2021-02-15

## 2021-02-15 ENCOUNTER — HOSPITAL ENCOUNTER (OUTPATIENT)
Dept: LAB | Facility: MEDICAL CENTER | Age: 71
End: 2021-02-15
Attending: INTERNAL MEDICINE
Payer: MEDICARE

## 2021-02-15 DIAGNOSIS — E78.5 DYSLIPIDEMIA: Chronic | ICD-10-CM

## 2021-02-15 DIAGNOSIS — E78.5 DYSLIPIDEMIA: ICD-10-CM

## 2021-02-15 DIAGNOSIS — R73.01 IMPAIRED FASTING GLUCOSE: Chronic | ICD-10-CM

## 2021-02-15 LAB
ALBUMIN SERPL BCP-MCNC: 4.3 G/DL (ref 3.2–4.9)
ALP SERPL-CCNC: 54 U/L (ref 30–99)
ALT SERPL-CCNC: 20 U/L (ref 2–50)
AST SERPL-CCNC: 26 U/L (ref 12–45)
BILIRUB CONJ SERPL-MCNC: <0.2 MG/DL (ref 0.1–0.5)
BILIRUB INDIRECT SERPL-MCNC: NORMAL MG/DL (ref 0–1)
BILIRUB SERPL-MCNC: 0.5 MG/DL (ref 0.1–1.5)
CHOLEST SERPL-MCNC: 162 MG/DL (ref 100–199)
EST. AVERAGE GLUCOSE BLD GHB EST-MCNC: 120 MG/DL
HBA1C MFR BLD: 5.8 % (ref 0–5.6)
HDLC SERPL-MCNC: 70 MG/DL
LDLC SERPL CALC-MCNC: 60 MG/DL
PROT SERPL-MCNC: 7 G/DL (ref 6–8.2)
TRIGL SERPL-MCNC: 161 MG/DL (ref 0–149)

## 2021-02-15 PROCEDURE — 36415 COLL VENOUS BLD VENIPUNCTURE: CPT

## 2021-02-15 PROCEDURE — 80061 LIPID PANEL: CPT

## 2021-02-15 PROCEDURE — 80076 HEPATIC FUNCTION PANEL: CPT

## 2021-02-15 PROCEDURE — 83036 HEMOGLOBIN GLYCOSYLATED A1C: CPT | Mod: GA

## 2021-02-15 RX ORDER — ATORVASTATIN CALCIUM 40 MG/1
40 TABLET, FILM COATED ORAL DAILY
Qty: 90 TABLET | Refills: 2 | Status: SHIPPED | OUTPATIENT
Start: 2021-02-15 | End: 2021-02-23

## 2021-02-16 NOTE — TELEPHONE ENCOUNTER
Please notify the patient that his blood test shows his cholesterol is improved:  (1) his cholesterol is 162 down from 240, and his bad cholesterol is 60 down from 154, have him continue the cholesterol medication  (2) I will send a refill of the cholesterol medication to his pharmacy  (3) his liver function is normal on the medication, and his blood sugar is minimally elevated, he does not have diabetes, have him continue a good exercise and nutrition program

## 2021-02-17 ENCOUNTER — TELEPHONE (OUTPATIENT)
Dept: MEDICAL GROUP | Facility: MEDICAL CENTER | Age: 71
End: 2021-02-17

## 2021-02-17 RX ORDER — ASPIRIN 81 MG/1
81 TABLET, CHEWABLE ORAL DAILY
Qty: 30 TABLET | Refills: 0
Start: 2021-02-17 | End: 2021-02-23

## 2021-02-17 NOTE — TELEPHONE ENCOUNTER
----- Message from Marcell Martinez M.D. sent at 2/15/2021  9:12 PM PST -----  Please notify the patient that his blood test shows his cholesterol is improved:  (1) his cholesterol is 162 down from 240, and his bad cholesterol is 60 down from 154, have him continue the cholesterol medication  (2) I will send a refill of the cholesterol medication to his pharmacy  (3) his liver function is normal on the medication, and his blood sugar is minimally elevated, he does not have diabetes, have him continue a good exercise and nutrition program

## 2021-02-23 ENCOUNTER — TELEPHONE (OUTPATIENT)
Dept: MEDICAL GROUP | Facility: MEDICAL CENTER | Age: 71
End: 2021-02-23

## 2021-02-23 ENCOUNTER — HOSPITAL ENCOUNTER (OUTPATIENT)
Dept: RADIOLOGY | Facility: MEDICAL CENTER | Age: 71
End: 2021-02-23
Attending: INTERNAL MEDICINE
Payer: MEDICARE

## 2021-02-23 ENCOUNTER — HOSPITAL ENCOUNTER (OUTPATIENT)
Dept: LAB | Facility: MEDICAL CENTER | Age: 71
End: 2021-02-23
Attending: INTERNAL MEDICINE
Payer: MEDICARE

## 2021-02-23 DIAGNOSIS — E04.1 THYROID NODULE: ICD-10-CM

## 2021-02-23 DIAGNOSIS — E78.5 DYSLIPIDEMIA: Chronic | ICD-10-CM

## 2021-02-23 LAB
CHOLEST SERPL-MCNC: 163 MG/DL (ref 100–199)
FASTING STATUS PATIENT QL REPORTED: NORMAL
HDLC SERPL-MCNC: 75 MG/DL
LDLC SERPL CALC-MCNC: 73 MG/DL
TRIGL SERPL-MCNC: 75 MG/DL (ref 0–149)

## 2021-02-23 PROCEDURE — 80061 LIPID PANEL: CPT

## 2021-02-23 PROCEDURE — 36415 COLL VENOUS BLD VENIPUNCTURE: CPT

## 2021-02-23 PROCEDURE — 76536 US EXAM OF HEAD AND NECK: CPT

## 2021-02-23 RX ORDER — ATORVASTATIN CALCIUM 40 MG/1
40 TABLET, FILM COATED ORAL DAILY
Qty: 90 TABLET | Refills: 3 | Status: SHIPPED | OUTPATIENT
Start: 2021-02-23 | End: 2021-03-12 | Stop reason: SDUPTHER

## 2021-02-23 NOTE — TELEPHONE ENCOUNTER
Please notify the patient his thyroid ultrasound result shows no significant increase in size compared to last year.  I would recommend repeating  the thyroid ultrasound in 1 year to continue to monitor the size.

## 2021-02-24 ENCOUNTER — TELEPHONE (OUTPATIENT)
Dept: MEDICAL GROUP | Facility: MEDICAL CENTER | Age: 71
End: 2021-02-24

## 2021-02-24 NOTE — TELEPHONE ENCOUNTER
----- Message from Marcell Martinez M.D. sent at 2/23/2021  3:21 PM PST -----  Please notify the patient his thyroid ultrasound result shows no significant increase in size compared to last year.  I would recommend repeating  the thyroid ultrasound in 1 year to continue to monitor the size.

## 2021-03-05 ENCOUNTER — IMMUNIZATION (OUTPATIENT)
Dept: FAMILY PLANNING/WOMEN'S HEALTH CLINIC | Facility: IMMUNIZATION CENTER | Age: 71
End: 2021-03-05
Attending: INTERNAL MEDICINE
Payer: MEDICARE

## 2021-03-05 DIAGNOSIS — Z23 ENCOUNTER FOR VACCINATION: Primary | ICD-10-CM

## 2021-03-05 PROCEDURE — 0012A MODERNA SARS-COV-2 VACCINE: CPT

## 2021-03-05 PROCEDURE — 91301 MODERNA SARS-COV-2 VACCINE: CPT

## 2021-03-12 DIAGNOSIS — E78.5 DYSLIPIDEMIA: Chronic | ICD-10-CM

## 2021-03-12 RX ORDER — ATORVASTATIN CALCIUM 40 MG/1
40 TABLET, FILM COATED ORAL DAILY
Qty: 90 TABLET | Refills: 3 | Status: SHIPPED | OUTPATIENT
Start: 2021-03-12 | End: 2022-03-02

## 2021-10-26 ENCOUNTER — TELEPHONE (OUTPATIENT)
Dept: MEDICAL GROUP | Facility: MEDICAL CENTER | Age: 71
End: 2021-10-26

## 2021-10-26 ENCOUNTER — HOSPITAL ENCOUNTER (OUTPATIENT)
Dept: LAB | Facility: MEDICAL CENTER | Age: 71
End: 2021-10-26
Attending: INTERNAL MEDICINE
Payer: MEDICARE

## 2021-10-26 DIAGNOSIS — E78.5 DYSLIPIDEMIA: ICD-10-CM

## 2021-10-26 LAB
CHOLEST SERPL-MCNC: 172 MG/DL (ref 100–199)
FASTING STATUS PATIENT QL REPORTED: NORMAL
HDLC SERPL-MCNC: 85 MG/DL
LDLC SERPL CALC-MCNC: 73 MG/DL
TRIGL SERPL-MCNC: 69 MG/DL (ref 0–149)

## 2021-10-26 PROCEDURE — 80061 LIPID PANEL: CPT

## 2021-10-26 PROCEDURE — 36415 COLL VENOUS BLD VENIPUNCTURE: CPT

## 2021-10-27 NOTE — TELEPHONE ENCOUNTER
Notified with labs, continue Lipitor no changes with medication, continue a good nutrition and exercise program, has follow-up appointment next month. He does not need to take a baby aspirin daily.

## 2021-11-18 ENCOUNTER — TELEPHONE (OUTPATIENT)
Dept: MEDICAL GROUP | Facility: MEDICAL CENTER | Age: 71
End: 2021-11-18

## 2021-11-18 ENCOUNTER — OFFICE VISIT (OUTPATIENT)
Dept: MEDICAL GROUP | Facility: MEDICAL CENTER | Age: 71
End: 2021-11-18
Payer: MEDICARE

## 2021-11-18 VITALS
TEMPERATURE: 97.8 F | OXYGEN SATURATION: 97 % | SYSTOLIC BLOOD PRESSURE: 146 MMHG | BODY MASS INDEX: 27.35 KG/M2 | DIASTOLIC BLOOD PRESSURE: 82 MMHG | HEIGHT: 75 IN | HEART RATE: 94 BPM | WEIGHT: 220 LBS

## 2021-11-18 DIAGNOSIS — Z85.46 HISTORY OF PROSTATE CANCER: ICD-10-CM

## 2021-11-18 DIAGNOSIS — N52.9 ERECTILE DYSFUNCTION, UNSPECIFIED ERECTILE DYSFUNCTION TYPE: ICD-10-CM

## 2021-11-18 DIAGNOSIS — E55.9 VITAMIN D DEFICIENCY: ICD-10-CM

## 2021-11-18 DIAGNOSIS — E78.5 DYSLIPIDEMIA: ICD-10-CM

## 2021-11-18 DIAGNOSIS — E04.1 THYROID NODULE: ICD-10-CM

## 2021-11-18 PROCEDURE — 99213 OFFICE O/P EST LOW 20 MIN: CPT | Performed by: INTERNAL MEDICINE

## 2021-11-18 RX ORDER — SILDENAFIL 100 MG/1
100 TABLET, FILM COATED ORAL PRN
Qty: 30 TABLET | Refills: 3 | Status: SHIPPED | OUTPATIENT
Start: 2021-11-18 | End: 2022-05-23 | Stop reason: SDUPTHER

## 2021-11-18 ASSESSMENT — ENCOUNTER SYMPTOMS
GENERAL WELL-BEING: EXCELLENT
HEARTBURN: 0
BLURRED VISION: 0
DIARRHEA: 0
COUGH: 0
CONSTIPATION: 0
SHORTNESS OF BREATH: 0
BLOOD IN STOOL: 0

## 2021-11-18 ASSESSMENT — FIBROSIS 4 INDEX: FIB4 SCORE: 1.68

## 2021-11-18 ASSESSMENT — ACTIVITIES OF DAILY LIVING (ADL): BATHING_REQUIRES_ASSISTANCE: 0

## 2021-11-18 ASSESSMENT — PATIENT HEALTH QUESTIONNAIRE - PHQ9: CLINICAL INTERPRETATION OF PHQ2 SCORE: 0

## 2021-11-18 NOTE — PROGRESS NOTES
Subjective     Hadley Hernández is a 71 y.o. male who presents with follow up cholesterol           HPI     Here for follow-up cholesterol, he is 2 days early for his Medicare wellness visit so we were not able to complete that today  Medications, allergies, medical history, surgical history, social history, family history  reviewed and updated  Still exercises 3-4 days per week does 30 minute walk   Shoulder pain bilateral worse with sleeping, has tried PT in the past no difference, no pain medications  Eye exam none recently   Teeth cleaning every three months   No mood changes, no memory changes  SH , eats healthy, drinks water, no soda, coffee two in am, etoh 2 per day, no tobacco      Current Outpatient Medications   Medication Sig Dispense Refill   • sildenafil citrate (VIAGRA) 100 MG tablet Take 1 Tablet by mouth as needed for Erectile Dysfunction. One per day 30 Tablet 1   • atorvastatin (LIPITOR) 40 MG Tab Take 1 tablet by mouth every day. 90 tablet 3     No current facility-administered medications for this visit.     thyroid nodule  2/10/20 ultrasound thyroid left nodule 1.1 x 0.9 x 1.0 cm TIRADS 6, repeat 1 year ultrasound  12/3/20 tsh 2.8  2/23/20 ultrasound thyroid 1.2 x 1.1 x 1.0 cm (previously 1.1 x 0.9 x 1.0 cm) left lower pole thyroid isoechoic solid mass TIRADS 3, mildly suspicious has enlarged slightly, recommend repeat ultrasound 1 year     Schatzki's ring  11/14/12 EGD per [facility], schatzki's ring, gastritis, 57 french dilation     Status post appendectomy     prev health  4/19/13 tdap  9/12/13 zoster vaccine  10/27/15 prevnar  10/17/17 cxr per [facility] plan negative  10/17/17 EKG per [facility] plan negative  10/17/17 spirometry FEV1 2.9,FVC 4.0 per [facility] plan  11/13/17 colon per [facility] diverticulosis, otherwise negative repeat 10 years  9/18/18 pneumovax  9/30/19 shingrix second in series   10/22/19 hep c ab negative  12/3/20 psa<0.02  12/3/20 vit d 40  3/5/21 covid moderna second  11/1/21 flu      insomnia  11/19/20 difficulty with sleep maintenance, has tried melatonin and ambien, continue ambien as needed     Impaired fasting blood sugar  6/7/10 A1c 6.0%, bs 109  12/5/11 A1c 5.8%, bs 146  9/26/13 A1c 6.1%,bs 140  12/5/14 A1c 5.8%,bs 111  12/5/15 A1c 5.9% done at [facility] benefit plan  10/17/17 A1c 5.5% per [facility] plan  6/7/18 A1c 5.5%  10/22/19 bs 114  12/3/20 A1c 5.6%  2/15/21 A1c 5.8%     History tobacco  Hx of tobacco quit 2010 after smoking 30 yrs    histoyr of shoulder pain  5/29/18 refer to [facility] performance phone [phone], fax [phone]  6/7/18 right shoulder x-ray arthritis proceed with physical therapy  6/7/18 [facility] PT note     History of prostate cancer  2003 diagnosed with prostate cancer s/p radical prostactomy   4/14/04 psa <0.1  11/5/08 psa <0.1  6/7/10 psa <0.1  12/5/11 psa <0.1  9/26/13 psa <0.01  12/5/14 psa <0.1  12/5/15 psa 0.1 done at [facility] benefit plan  10/17/17 psa<0.1 per [facility] plan  6/7/18 psa<0.01  10/22/19 psa<0.01  12/3/20 psa<0.02     Dyslipidemia  10/17/04 chol 230,trig 67,hdl 63,ldl 154  11/5/08 chol 243,trig 69,hdl 63,ldl 166  2010 started on niacin 2000 mg daily  6/7/10 chol 234,trig 114,hdl 63,ldl 148  12/5/11 chol 198,trig 96,hdl 57,ldl 122,crp 0.8  9/26/13 chol 249,trig 143,hdl 53,ldl 167,LDL-P 2088,HDL-P 42,LP-IR 70  12/5/14 chol 214,trig 62,hdl 74,ldl 128,crp 0.8  12/5/15 chol 190,trig 62,hdl 64,ldl 114 done at [facility] benefit plan  10/17/17 chol 250,trig 134,hdl 58,ldl 165 per [facility] plan  6/7/18 chol 218,trig 59,hdl 65,ldl 141  10/22/19 chol 253,trig 93,hdl 72,ldl 162 offered statin  10/29/19 patient declines statin  12/3/20 chol 240,trig 101,hdl 66,ldl 154; 10 year risk 19% declines statin  12/17/20 CT cardiac calcium score LMA 80.4, LCx 0.0, LAD 80.6, .4 = 302.4  12/17/20 start lipitor 40 mg repeat labs 4 weeks  2/15/21 chol 162,trig 161,hdl 70,ldl 60 on lipitor 40 mg  2/23/21 chol 163,trig 75,hdl 75,ldl 73 on lipitor 40 mg  10/26/21 chol 172,trig 69,hdl  85,ldl 73 on lipitor 40 mg    Patient Active Problem List   Diagnosis   • Dyslipidemia   • Erectile dysfunction   • History of prostate cancer   • History of tobacco abuse   • S/P appendectomy   • Preventative health care   • Schatzki's ring   • Impaired fasting glucose   • History of shoulder pain   • Thyroid nodule   • Insomnia         Review of Systems   HENT: Negative for hearing loss.    Eyes: Negative for blurred vision.   Respiratory: Negative for cough and shortness of breath.    Cardiovascular: Negative for chest pain.   Gastrointestinal: Negative for blood in stool, constipation, diarrhea and heartburn.     Occasional nocturia  Some hearing loss     Depression Screening    Little interest or pleasure in doing things?  0 - not at all  Feeling down, depressed , or hopeless? 0 - not at all  Patient Health Questionnaire Score: 0     If depressive symptoms identified deferred to follow up visit unless specifically addressed in assessment and plan.    Interpretation of PHQ-9 Total Score   Score Severity   1-4 No Depression   5-9 Mild Depression   10-14 Moderate Depression   15-19 Moderately Severe Depression   20-27 Severe Depression    Screening for Cognitive Impairment    Three Minute Recall (captain, garden, picture) 2/3    Wally clock face with all 12 numbers and set the hands to show 5 past 8.  Yes    Cognitive concerns identified deferred for follow up unless specifically addressed in assessment and plan.    Fall Risk Assessment    Has the patient had two or more falls in the last year or any fall with injury in the last year?  No    Safety Assessment    Throw rugs on floor.  Yes  Handrails on all stairs.  Yes  Good lighting in all hallways.  Yes  Difficulty hearing.  Yes  Patient counseled about all safety risks that were identified.    Functional Assessment ADLs    Are there any barriers preventing you from cooking for yourself or meeting nutritional needs?  No.    Are there any barriers preventing you  from driving safely or obtaining transportation?  No.    Are there any barriers preventing you from using a telephone or calling for help?  No.    Are there any barriers preventing you from shopping?  No.    Are there any barriers preventing you from taking care of your own finances?  No.    Are there any barriers preventing you from managing your medications?  No.    Are there any barriers preventing you from showering, bathing or dressing yourself?  No.    Are you currently engaging in any exercise or physical activity?  Yes.     What is your perception of your health?  Excellent.      Health Maintenance Summary          Overdue - COVID-19 Vaccine (3 - Booster for Moderna series) Overdue since 9/5/2021 03/05/2021  Imm Admin: Moderna SARS-CoV-2 Vaccine    02/06/2021  Imm Admin: Moderna SARS-CoV-2 Vaccine          Annual Wellness Visit (Every 366 Days) Next due on 11/20/2021 11/19/2020  Visit Dx: Medicare annual wellness visit, subsequent    09/30/2019  Visit Dx: Medicare annual wellness visit, subsequent    09/18/2018  Visit Dx: Medicare annual wellness visit, subsequent    12/26/2014  Visit Dx: Medicare annual wellness visit, subsequent          IMM DTaP/Tdap/Td Vaccine (3 - Td or Tdap) Next due on 10/27/2025    10/27/2015  Imm Admin: Tdap Vaccine    04/09/2013  Imm Admin: Tdap Vaccine          COLORECTAL CANCER SCREENING (COLONOSCOPY - Every 10 Years) Tentatively due on 11/13/2027 11/13/2017  REFERRAL TO GI FOR COLONOSCOPY    11/14/2012  AMB REFERRAL TO GI FOR COLONOSCOPY          IMM PNEUMOCOCCAL VACCINE: 65+ Years (Series Information) Completed    09/18/2018  Imm Admin: Pneumococcal polysaccharide vaccine (PPSV-23)    10/27/2015  Imm Admin: Pneumococcal Conjugate Vaccine (Prevnar/PCV-13)    04/09/2013  Imm Admin: Pneumococcal polysaccharide vaccine (PPSV-23)          IMM ZOSTER VACCINES (Series Information) Completed    09/30/2019  Imm Admin: Zoster Vaccine Recombinant (RZV) (SHINGRIX)    12/13/2018   Imm Admin: Zoster Vaccine Recombinant (RZV) (SHINGRIX)    09/12/2013  Imm Admin: Zoster Vaccine Live (ZVL) (Zostavax) - HISTORICAL DATA          HEPATITIS C SCREENING  Completed    10/22/2019  HEP C VIRUS ANTIBODY          IMM INFLUENZA (Series Information) Completed    11/01/2021  Imm Admin: Influenza Vaccine Adult HD    09/30/2019  Imm Admin: Influenza Vaccine Adult HD    09/18/2018  Imm Admin: Influenza Vaccine Adult HD    09/29/2017  Imm Admin: Influenza Vaccine Quad Inj (Pf)    10/11/2016  Imm Admin: Influenza (IM) Preservative Free - HISTORICAL DATA    Only the first 5 history entries have been loaded, but more history exists.          IMM HEP B VACCINE (Series Information) Aged Out    No completion history exists for this topic.          IMM MENINGOCOCCAL VACCINE (MCV4) (Series Information) Aged Out    No completion history exists for this topic.                Patient Care Team:  Marcell Martinez M.D. as PCP - General (Internal Medicine)      Objective          Physical Exam  Vitals and nursing note reviewed.   Constitutional:       Appearance: Normal appearance.   HENT:      Head: Normocephalic and atraumatic.      Right Ear: External ear normal.      Left Ear: External ear normal.   Eyes:      Conjunctiva/sclera: Conjunctivae normal.   Cardiovascular:      Rate and Rhythm: Normal rate and regular rhythm.      Heart sounds: Normal heart sounds.   Pulmonary:      Effort: Pulmonary effort is normal.      Breath sounds: Normal breath sounds.   Abdominal:      General: There is no distension.   Musculoskeletal:      Cervical back: Neck supple.   Skin:     General: Skin is warm.   Neurological:      General: No focal deficit present.      Mental Status: He is alert.   Psychiatric:         Mood and Affect: Mood normal.         Behavior: Behavior normal.          Assessment & Plan        Assessment  #1 Dyslipidemia most recent labs 10/26/21 chol 172,trig 69,hdl 85,ldl 73 on lipitor 40 mg      #2 history prostate  cancer last psa<0.02    #3 impaired glucose metabolism 2/15/21 A1c 5.8%    #4 thyroid nodule 2/23/20 ultrasound thyroid 1.2 x 1.1 x 1.0 cm (previously 1.1 x 0.9 x 1.0 cm) left lower pole thyroid isoechoic solid mass TIRADS 3, mildly suspicious has enlarged slightly, recommend repeat ultrasound 1 year    #5 history of low vitamin D    Plan  #!  Continue Lipitor reviewed labs from October 26    #2 patient has had his Covid booster from [facility] on 10/23/21 we need those records    #3 declines hearing test     #4 labs ordered    #5 ultrasound thyroid    #6 continue good nutrition, diet, exercise    #7 follow-up for Medicare wellness visit next year

## 2021-12-09 ENCOUNTER — HOSPITAL ENCOUNTER (OUTPATIENT)
Dept: RADIOLOGY | Facility: MEDICAL CENTER | Age: 71
End: 2021-12-09
Attending: INTERNAL MEDICINE
Payer: MEDICARE

## 2021-12-09 DIAGNOSIS — E04.1 THYROID NODULE: ICD-10-CM

## 2021-12-09 PROCEDURE — 76536 US EXAM OF HEAD AND NECK: CPT

## 2021-12-10 ENCOUNTER — TELEPHONE (OUTPATIENT)
Dept: MEDICAL GROUP | Facility: MEDICAL CENTER | Age: 71
End: 2021-12-10

## 2021-12-10 NOTE — TELEPHONE ENCOUNTER
Called the patient and left a message, please notify him that his thyroid ultrasound shows no change in the left thyroid nodule.      It is the same size as last year.  I would recommend repeating a follow-up thyroid ultrasound in 1 year to monitor for any growth in the size of the nodule.

## 2021-12-13 ENCOUNTER — TELEPHONE (OUTPATIENT)
Dept: MEDICAL GROUP | Facility: MEDICAL CENTER | Age: 71
End: 2021-12-13

## 2021-12-13 NOTE — TELEPHONE ENCOUNTER
----- Message from Marcell Martinez M.D. sent at 12/10/2021 11:15 AM PST -----  Called the patient and left a message, please notify him that his thyroid ultrasound shows no change in the left thyroid nodule.      It is the same size as last year.  I would recommend repeating a follow-up thyroid ultrasound in 1 year to monitor for any growth in the size of the nodule.

## 2021-12-21 ENCOUNTER — HOSPITAL ENCOUNTER (OUTPATIENT)
Dept: LAB | Facility: MEDICAL CENTER | Age: 71
End: 2021-12-21
Attending: INTERNAL MEDICINE
Payer: MEDICARE

## 2021-12-21 ENCOUNTER — TELEPHONE (OUTPATIENT)
Dept: MEDICAL GROUP | Facility: MEDICAL CENTER | Age: 71
End: 2021-12-21

## 2021-12-21 DIAGNOSIS — E55.9 VITAMIN D DEFICIENCY: ICD-10-CM

## 2021-12-21 DIAGNOSIS — E78.5 DYSLIPIDEMIA: ICD-10-CM

## 2021-12-21 DIAGNOSIS — Z85.46 HISTORY OF PROSTATE CANCER: ICD-10-CM

## 2021-12-21 LAB
25(OH)D3 SERPL-MCNC: 77 NG/ML (ref 30–100)
ALBUMIN SERPL BCP-MCNC: 4.3 G/DL (ref 3.2–4.9)
ALBUMIN/GLOB SERPL: 1.7 G/DL
ALP SERPL-CCNC: 48 U/L (ref 30–99)
ALT SERPL-CCNC: 28 U/L (ref 2–50)
ANION GAP SERPL CALC-SCNC: 11 MMOL/L (ref 7–16)
AST SERPL-CCNC: 32 U/L (ref 12–45)
BASOPHILS # BLD AUTO: 1.1 % (ref 0–1.8)
BASOPHILS # BLD: 0.06 K/UL (ref 0–0.12)
BILIRUB SERPL-MCNC: 0.4 MG/DL (ref 0.1–1.5)
BUN SERPL-MCNC: 9 MG/DL (ref 8–22)
CALCIUM SERPL-MCNC: 9.1 MG/DL (ref 8.4–10.2)
CHLORIDE SERPL-SCNC: 102 MMOL/L (ref 96–112)
CO2 SERPL-SCNC: 23 MMOL/L (ref 20–33)
CREAT SERPL-MCNC: 0.75 MG/DL (ref 0.5–1.4)
EOSINOPHIL # BLD AUTO: 0.14 K/UL (ref 0–0.51)
EOSINOPHIL NFR BLD: 2.6 % (ref 0–6.9)
ERYTHROCYTE [DISTWIDTH] IN BLOOD BY AUTOMATED COUNT: 40.8 FL (ref 35.9–50)
FASTING STATUS PATIENT QL REPORTED: NORMAL
GLOBULIN SER CALC-MCNC: 2.6 G/DL (ref 1.9–3.5)
GLUCOSE SERPL-MCNC: 110 MG/DL (ref 65–99)
HCT VFR BLD AUTO: 45.6 % (ref 42–52)
HGB BLD-MCNC: 15 G/DL (ref 14–18)
IMM GRANULOCYTES # BLD AUTO: 0.01 K/UL (ref 0–0.11)
IMM GRANULOCYTES NFR BLD AUTO: 0.2 % (ref 0–0.9)
LYMPHOCYTES # BLD AUTO: 1.62 K/UL (ref 1–4.8)
LYMPHOCYTES NFR BLD: 30.2 % (ref 22–41)
MCH RBC QN AUTO: 31.6 PG (ref 27–33)
MCHC RBC AUTO-ENTMCNC: 32.9 G/DL (ref 33.7–35.3)
MCV RBC AUTO: 96.2 FL (ref 81.4–97.8)
MONOCYTES # BLD AUTO: 0.51 K/UL (ref 0–0.85)
MONOCYTES NFR BLD AUTO: 9.5 % (ref 0–13.4)
NEUTROPHILS # BLD AUTO: 3.02 K/UL (ref 1.82–7.42)
NEUTROPHILS NFR BLD: 56.4 % (ref 44–72)
NRBC # BLD AUTO: 0 K/UL
NRBC BLD-RTO: 0 /100 WBC
PLATELET # BLD AUTO: 244 K/UL (ref 164–446)
PMV BLD AUTO: 9.6 FL (ref 9–12.9)
POTASSIUM SERPL-SCNC: 4.4 MMOL/L (ref 3.6–5.5)
PROT SERPL-MCNC: 6.9 G/DL (ref 6–8.2)
PSA SERPL-MCNC: <0.02 NG/ML (ref 0–4)
RBC # BLD AUTO: 4.74 M/UL (ref 4.7–6.1)
SODIUM SERPL-SCNC: 136 MMOL/L (ref 135–145)
TSH SERPL DL<=0.005 MIU/L-ACNC: 4.3 UIU/ML (ref 0.38–5.33)
WBC # BLD AUTO: 5.4 K/UL (ref 4.8–10.8)

## 2021-12-21 PROCEDURE — 84153 ASSAY OF PSA TOTAL: CPT

## 2021-12-21 PROCEDURE — 84443 ASSAY THYROID STIM HORMONE: CPT

## 2021-12-21 PROCEDURE — 85025 COMPLETE CBC W/AUTO DIFF WBC: CPT

## 2021-12-21 PROCEDURE — 36415 COLL VENOUS BLD VENIPUNCTURE: CPT

## 2021-12-21 PROCEDURE — 82306 VITAMIN D 25 HYDROXY: CPT

## 2021-12-21 PROCEDURE — 80053 COMPREHEN METABOLIC PANEL: CPT

## 2021-12-22 NOTE — TELEPHONE ENCOUNTER
Notified with labs fasting blood sugar improved, he will continue to moderate his sweets and carbohydrates during the holidays, also will work on moderation of alcohol intake, his liver function, kidney function, thyroid, vitamin D and prostate tests are fine.

## 2022-01-12 DIAGNOSIS — G47.00 INSOMNIA, UNSPECIFIED TYPE: ICD-10-CM

## 2022-01-12 RX ORDER — ZOLPIDEM TARTRATE 10 MG/1
TABLET ORAL
Qty: 30 TABLET | Refills: 3 | Status: SHIPPED | OUTPATIENT
Start: 2022-01-12 | End: 2022-02-11

## 2022-03-02 DIAGNOSIS — E78.5 DYSLIPIDEMIA: Chronic | ICD-10-CM

## 2022-03-02 RX ORDER — ATORVASTATIN CALCIUM 40 MG/1
40 TABLET, FILM COATED ORAL DAILY
Qty: 90 TABLET | Refills: 2 | Status: SHIPPED | OUTPATIENT
Start: 2022-03-02 | End: 2022-09-09

## 2022-07-25 ENCOUNTER — OFFICE VISIT (OUTPATIENT)
Dept: MEDICAL GROUP | Facility: MEDICAL CENTER | Age: 72
End: 2022-07-25
Payer: MEDICARE

## 2022-07-25 VITALS
WEIGHT: 214 LBS | SYSTOLIC BLOOD PRESSURE: 138 MMHG | DIASTOLIC BLOOD PRESSURE: 76 MMHG | HEART RATE: 79 BPM | TEMPERATURE: 98.4 F | OXYGEN SATURATION: 96 % | HEIGHT: 75 IN | BODY MASS INDEX: 26.61 KG/M2

## 2022-07-25 DIAGNOSIS — R42 LIGHTHEADED: ICD-10-CM

## 2022-07-25 DIAGNOSIS — Z85.46 HISTORY OF PROSTATE CANCER: ICD-10-CM

## 2022-07-25 DIAGNOSIS — E78.5 DYSLIPIDEMIA: ICD-10-CM

## 2022-07-25 DIAGNOSIS — E53.8 B12 DEFICIENCY: ICD-10-CM

## 2022-07-25 DIAGNOSIS — R35.1 NOCTURIA: ICD-10-CM

## 2022-07-25 DIAGNOSIS — E55.9 VITAMIN D DEFICIENCY: ICD-10-CM

## 2022-07-25 DIAGNOSIS — R73.01 IMPAIRED FASTING GLUCOSE: ICD-10-CM

## 2022-07-25 PROCEDURE — 99214 OFFICE O/P EST MOD 30 MIN: CPT | Performed by: INTERNAL MEDICINE

## 2022-07-25 RX ORDER — ZOLPIDEM TARTRATE 10 MG/1
TABLET ORAL
COMMUNITY
Start: 2022-05-30

## 2022-07-25 ASSESSMENT — PATIENT HEALTH QUESTIONNAIRE - PHQ9: CLINICAL INTERPRETATION OF PHQ2 SCORE: 0

## 2022-07-25 ASSESSMENT — FIBROSIS 4 INDEX: FIB4 SCORE: 1.81

## 2022-07-25 NOTE — PROGRESS NOTES
Subjective     Hadley Hernández is a 71 y.o. male who presents fall        HPI   Initial complaint was a fall.  Patient most recent fall occurred 4 to 5 days ago had his foot up on a counter and was putting a band aid on his leg when he excellently fell.  Caught his leg on something. Another previous episode bending over and fell forwards, no palpitations or lightheadedness or chest pain.  That is not the patient's main symptomology.  Patient states he sometimes can feel a dizziness type sensation but not a room spinning sensation, the sensation can happen on occasion, not related to activity, occurs occasionally, does not happen with activity or exercise.  No headache, no vision changes, no tinnitus, no vertigo, no history of dizziness.  No associated chest pain, palpitations, shortness of breath, syncope.  Tries to keep well-hydrated.  His main symptoms will usually  happen in the am when first awakening, he will be lying in bed will experience a skin crawling sensation over his body, not localized, no flushing, no rash, but no associated chest pain, will feel like a pins and needles over his body, does have nausea in am with the pins and needles symptms for the last few months.  Does not really notice it the rest of the day. No nausea with meals, no gerd, no change in appetite, no dysphagia or odynophagia, no change in stools, no blood in stools   Wife states patient had one episode of stopping breathing at night for 5 seconds, no snoring.  No history of sleep apnea.  Will walk for 2 miles at a time 3-4 days per week, and no lightheadedness when going for walks.   Tries to drink water 120 oz per day. Caffeine one per day. etoh champagne 1-2 per day  Medications, allergies, medical history, surgical history, social history, family history  reviewed and updated        Current Outpatient Medications   Medication Sig Dispense Refill   • zolpidem (AMBIEN) 10 MG Tab      • sildenafil citrate (VIAGRA) 100 MG  tablet Take 1 Tablet by mouth as needed for Erectile Dysfunction. One per day 30 Tablet 3   • atorvastatin (LIPITOR) 40 MG Tab TAKE 1 TABLET BY MOUTH EVERY DAY 90 Tablet 2     No current facility-administered medications for this visit.     thyroid nodule  2/10/20 ultrasound thyroid left nodule 1.1 x 0.9 x 1.0 cm TIRADS 6, repeat 1 year ultrasound  12/3/20 tsh 2.8  2/23/20 ultrasound thyroid 1.2 x 1.1 x 1.0 cm (previously 1.1 x 0.9 x 1.0 cm) left lower pole thyroid isoechoic solid mass TIRADS 3, mildly suspicious has enlarged slightly, recommend repeat ultrasound 1 year  12/10/21 ultrasound thyroid left lower nodule 1.0 x 1.1 x 1.1 cm (previously 1.2 x 1.1 x 1.0 cm)  12/21/21 tsh 4.3     Schatzki's ring  11/14/12 EGD per [facility], schatzki's ring, gastritis, 57 french dilation     Status post appendectomy     prev health  4/19/13 tdap  10/27/15 prevnar  11/13/17 colon per [facility] diverticulosis, otherwise negative repeat 10 years  9/18/18 pneumovax  9/30/19 shingrix second in series   10/22/19 hep c ab negative  12/21/21 psa<0.02  12/21/21 vit d 77  5/16/22 covid booster     insomnia  11/19/20 difficulty with sleep maintenance, has tried melatonin and ambien, continue ambien as needed     Impaired fasting blood sugar  6/7/10 A1c 6.0%, bs 109  12/5/11 A1c 5.8%, bs 146  9/26/13 A1c 6.1%,bs 140  12/5/14 A1c 5.8%,bs 111  12/5/15 A1c 5.9% done at [facility] benefit plan  10/17/17 A1c 5.5% per [facility] plan  6/7/18 A1c 5.5%  10/22/19 bs 114  12/3/20 A1c 5.6%  2/15/21 A1c 5.8%     History tobacco  Hx of tobacco quit 2010 after smoking 30 yrs     histoyr of shoulder pain  5/29/18 refer to [facility] phone [phone], fax [phone]  6/7/18 right shoulder x-ray arthritis proceed with physical therapy  6/7/18 [facility] PT note     History of prostate cancer  2003 diagnosed with prostate cancer s/p radical prostactomy   4/14/04 psa <0.1  11/5/08 psa <0.1  6/7/10 psa <0.1  12/5/11 psa <0.1  9/26/13 psa <0.01  12/5/14 psa  <0.1  12/5/15 psa 0.1 done at [facility] benefit plan  10/17/17 psa<0.1 per [facility] plan  6/7/18 psa<0.01  10/22/19 psa<0.01  12/3/20 psa<0.02  12/21/21 psa<0.02     Dyslipidemia  10/17/04 chol 230,trig 67,hdl 63,ldl 154  11/5/08 chol 243,trig 69,hdl 63,ldl 166  2010 started on niacin 2000 mg daily  6/7/10 chol 234,trig 114,hdl 63,ldl 148  12/5/11 chol 198,trig 96,hdl 57,ldl 122,crp 0.8  9/26/13 chol 249,trig 143,hdl 53,ldl 167,LDL-P 2088,HDL-P 42,LP-IR 70  12/5/14 chol 214,trig 62,hdl 74,ldl 128,crp 0.8  12/5/15 chol 190,trig 62,hdl 64,ldl 114 done at [facility] benefit plan  10/17/17 chol 250,trig 134,hdl 58,ldl 165 per [facility] plan  6/7/18 chol 218,trig 59,hdl 65,ldl 141  10/22/19 chol 253,trig 93,hdl 72,ldl 162 offered statin  10/29/19 patient declines statin  12/3/20 chol 240,trig 101,hdl 66,ldl 154; 10 year risk 19% declines statin  12/17/20 CT cardiac calcium score LMA 80.4, LCx 0.0, LAD 80.6, .4 = 302.4  12/17/20 start lipitor 40 mg repeat labs 4 weeks  2/15/21 chol 162,trig 161,hdl 70,ldl 60 on lipitor 40 mg  2/23/21 chol 163,trig 75,hdl 75,ldl 73 on lipitor 40 mg  10/26/21 chol 172,trig 69,hdl 85,ldl 73 on lipitor 40 mg         Patient Active Problem List   Diagnosis   • Dyslipidemia   • Erectile dysfunction   • History of prostate cancer   • History of tobacco abuse   • S/P appendectomy   • Preventative health care   • Schatzki's ring   • Impaired fasting glucose   • History of shoulder pain   • Thyroid nodule   • Insomnia     Depression Screening  Little interest or pleasure in doing things?  0 - not at all  Feeling down, depressed , or hopeless? 0 - not at all  Patient Health Questionnaire Score: 0       Patient Care Team:  Marcell Martinez M.D. as PCP - General (Internal Medicine)      ROS           Objective          Physical Exam  Vitals and nursing note reviewed.   Constitutional:       Appearance: Normal appearance.   HENT:      Head: Normocephalic and atraumatic.      Right Ear: External ear normal.       Left Ear: External ear normal.   Eyes:      Conjunctiva/sclera: Conjunctivae normal.   Cardiovascular:      Rate and Rhythm: Normal rate and regular rhythm.      Heart sounds: Normal heart sounds.   Pulmonary:      Effort: Pulmonary effort is normal.      Breath sounds: Normal breath sounds.   Abdominal:      General: Abdomen is flat. There is no distension.      Palpations: Abdomen is soft. There is no mass.      Tenderness: There is no abdominal tenderness. There is no guarding.   Musculoskeletal:         General: No deformity.      Cervical back: Neck supple. No rigidity.   Skin:     General: Skin is warm.      Coloration: Skin is not jaundiced.      Findings: No bruising.   Neurological:      General: No focal deficit present.      Mental Status: He is alert.      Motor: No weakness.      Gait: Gait normal.   Psychiatric:         Mood and Affect: Mood normal.         Behavior: Behavior normal.         Thought Content: Thought content normal.                     Assessment & Plan        Assessment  #1 recent fall when he was putting a Band-Aid on his leg and had his leg elevated, fall is related to slipping while in an awkward position, had 1 previous episode months ago when he was standing up from a crouching position and lost his balance, no associated palpitations, chest pain, or syncope, perhaps some dizziness or lightheadedness with this, the first episode does not sound like this was cardiac related second episode possibly a bit orthostatic related    #2  Tingling sensation over his body upon first awakening in the morning not associate with lightheadedness, occasional nausea with this but no heartburn or GI upset or change in bowel habits, does not happen the rest of the day, does not occur with meals, consider thyroid disorder, diabetes, vitamin deficiency such as B12 deficiency    #3 question stopping breathing at night according to his wife, no snoring, no history of sleep apnea, consider sleep  disordered breathing      #4 vitamin D deficiency    #5 impaired glucose metabolism    #6 dyslipidemia currently on Lipitor do not suspect statin side effects    Plan  #! Labs ordered    #2 ekg ordered, unfortunately our EKG machine is still not working, EKG  ordered to be done through cardiology    #3 echo evaluation for possible lightheadedness, near syncope    #4 event monitor 2 week evaluate for arrhythmia with possible lightheadedness, near syncope    #5 overnight pulse oximeter to [facility] evaluate for sleep apnea with sleep disordered breathing, stopping breathing at night according to his wife    #6  For now monitor symptoms, ensure adequate hydration, continue exercise, the symptoms of tingling and nausea in the morning do not seem to be clearly cardiac related or GI related, symptoms do not occur with exercise with meals     #7 follow-up 3 months

## 2022-07-28 ENCOUNTER — NON-PROVIDER VISIT (OUTPATIENT)
Dept: CARDIOLOGY | Facility: MEDICAL CENTER | Age: 72
End: 2022-07-28
Attending: INTERNAL MEDICINE
Payer: MEDICARE

## 2022-07-28 DIAGNOSIS — R42 LIGHTHEADED: ICD-10-CM

## 2022-07-28 DIAGNOSIS — I49.3 PVCS (PREMATURE VENTRICULAR CONTRACTIONS): ICD-10-CM

## 2022-07-28 DIAGNOSIS — I47.10 SVT (SUPRAVENTRICULAR TACHYCARDIA) (HCC): ICD-10-CM

## 2022-07-28 DIAGNOSIS — I49.1 PREMATURE ATRIAL CONTRACTIONS: ICD-10-CM

## 2022-07-28 NOTE — PROGRESS NOTES
Home enrollment completed for the 14 day [employer]T Holter monitoring program per Marcell Martinez MD.  >Monitor delivered to patient 8/1/22.  >Currently pending EOS.

## 2022-07-29 ENCOUNTER — HOSPITAL ENCOUNTER (OUTPATIENT)
Dept: LAB | Facility: MEDICAL CENTER | Age: 72
End: 2022-07-29
Attending: INTERNAL MEDICINE
Payer: MEDICARE

## 2022-07-29 ENCOUNTER — TELEPHONE (OUTPATIENT)
Dept: MEDICAL GROUP | Facility: MEDICAL CENTER | Age: 72
End: 2022-07-29

## 2022-07-29 DIAGNOSIS — E53.8 B12 DEFICIENCY: ICD-10-CM

## 2022-07-29 DIAGNOSIS — R73.01 IMPAIRED FASTING GLUCOSE: ICD-10-CM

## 2022-07-29 DIAGNOSIS — E55.9 VITAMIN D DEFICIENCY: ICD-10-CM

## 2022-07-29 DIAGNOSIS — Z85.46 HISTORY OF PROSTATE CANCER: ICD-10-CM

## 2022-07-29 DIAGNOSIS — E78.5 DYSLIPIDEMIA: ICD-10-CM

## 2022-07-29 LAB
25(OH)D3 SERPL-MCNC: 88 NG/ML (ref 30–100)
ALBUMIN SERPL BCP-MCNC: 4.1 G/DL (ref 3.2–4.9)
ALBUMIN/GLOB SERPL: 1.5 G/DL
ALP SERPL-CCNC: 53 U/L (ref 30–99)
ALT SERPL-CCNC: 33 U/L (ref 2–50)
ANION GAP SERPL CALC-SCNC: 13 MMOL/L (ref 7–16)
AST SERPL-CCNC: 42 U/L (ref 12–45)
BASOPHILS # BLD AUTO: 0.9 % (ref 0–1.8)
BASOPHILS # BLD: 0.04 K/UL (ref 0–0.12)
BILIRUB SERPL-MCNC: 0.5 MG/DL (ref 0.1–1.5)
BUN SERPL-MCNC: 7 MG/DL (ref 8–22)
CALCIUM SERPL-MCNC: 8.9 MG/DL (ref 8.4–10.2)
CHLORIDE SERPL-SCNC: 101 MMOL/L (ref 96–112)
CHOLEST SERPL-MCNC: 159 MG/DL (ref 100–199)
CO2 SERPL-SCNC: 22 MMOL/L (ref 20–33)
CREAT SERPL-MCNC: 0.68 MG/DL (ref 0.5–1.4)
EOSINOPHIL # BLD AUTO: 0.13 K/UL (ref 0–0.51)
EOSINOPHIL NFR BLD: 2.9 % (ref 0–6.9)
ERYTHROCYTE [DISTWIDTH] IN BLOOD BY AUTOMATED COUNT: 43.8 FL (ref 35.9–50)
EST. AVERAGE GLUCOSE BLD GHB EST-MCNC: 114 MG/DL
FASTING STATUS PATIENT QL REPORTED: NORMAL
GFR SERPLBLD CREATININE-BSD FMLA CKD-EPI: 98 ML/MIN/1.73 M 2
GLOBULIN SER CALC-MCNC: 2.8 G/DL (ref 1.9–3.5)
GLUCOSE SERPL-MCNC: 99 MG/DL (ref 65–99)
HBA1C MFR BLD: 5.6 % (ref 4–5.6)
HCT VFR BLD AUTO: 43.1 % (ref 42–52)
HDLC SERPL-MCNC: 71 MG/DL
HGB BLD-MCNC: 14.4 G/DL (ref 14–18)
IMM GRANULOCYTES # BLD AUTO: 0.02 K/UL (ref 0–0.11)
IMM GRANULOCYTES NFR BLD AUTO: 0.4 % (ref 0–0.9)
LDLC SERPL CALC-MCNC: 74 MG/DL
LYMPHOCYTES # BLD AUTO: 1.57 K/UL (ref 1–4.8)
LYMPHOCYTES NFR BLD: 34.5 % (ref 22–41)
MCH RBC QN AUTO: 32.8 PG (ref 27–33)
MCHC RBC AUTO-ENTMCNC: 33.4 G/DL (ref 33.7–35.3)
MCV RBC AUTO: 98.2 FL (ref 81.4–97.8)
MONOCYTES # BLD AUTO: 0.54 K/UL (ref 0–0.85)
MONOCYTES NFR BLD AUTO: 11.9 % (ref 0–13.4)
NEUTROPHILS # BLD AUTO: 2.25 K/UL (ref 1.82–7.42)
NEUTROPHILS NFR BLD: 49.4 % (ref 44–72)
NRBC # BLD AUTO: 0 K/UL
NRBC BLD-RTO: 0 /100 WBC
PLATELET # BLD AUTO: 245 K/UL (ref 164–446)
PMV BLD AUTO: 9.8 FL (ref 9–12.9)
POTASSIUM SERPL-SCNC: 4.3 MMOL/L (ref 3.6–5.5)
PROT SERPL-MCNC: 6.9 G/DL (ref 6–8.2)
PSA SERPL-MCNC: <0.02 NG/ML (ref 0–4)
RBC # BLD AUTO: 4.39 M/UL (ref 4.7–6.1)
SODIUM SERPL-SCNC: 136 MMOL/L (ref 135–145)
TRIGL SERPL-MCNC: 72 MG/DL (ref 0–149)
TSH SERPL DL<=0.005 MIU/L-ACNC: 3.23 UIU/ML (ref 0.38–5.33)
VIT B12 SERPL-MCNC: 614 PG/ML (ref 211–911)
WBC # BLD AUTO: 4.6 K/UL (ref 4.8–10.8)

## 2022-07-29 PROCEDURE — 83036 HEMOGLOBIN GLYCOSYLATED A1C: CPT | Mod: GA

## 2022-07-29 PROCEDURE — 36415 COLL VENOUS BLD VENIPUNCTURE: CPT

## 2022-07-29 PROCEDURE — 85025 COMPLETE CBC W/AUTO DIFF WBC: CPT

## 2022-07-29 PROCEDURE — 80061 LIPID PANEL: CPT

## 2022-07-29 PROCEDURE — 84153 ASSAY OF PSA TOTAL: CPT

## 2022-07-29 PROCEDURE — 80053 COMPREHEN METABOLIC PANEL: CPT

## 2022-07-29 PROCEDURE — 82306 VITAMIN D 25 HYDROXY: CPT

## 2022-07-29 PROCEDURE — 84443 ASSAY THYROID STIM HORMONE: CPT

## 2022-07-29 PROCEDURE — 82607 VITAMIN B-12: CPT

## 2022-07-29 NOTE — TELEPHONE ENCOUNTER
Notified with labs no changes, awaiting event monitor, provided #[id number] for the echo which was ordered last week, also provided local number to [facility] with regards to overnight oximetry order which we faxed last week.

## 2022-08-16 ENCOUNTER — PATIENT MESSAGE (OUTPATIENT)
Dept: MEDICAL GROUP | Facility: MEDICAL CENTER | Age: 72
End: 2022-08-16
Payer: MEDICARE

## 2022-08-19 ENCOUNTER — TELEPHONE (OUTPATIENT)
Dept: CARDIOLOGY | Facility: MEDICAL CENTER | Age: 72
End: 2022-08-19
Payer: MEDICARE

## 2022-08-19 PROBLEM — I47.10 SVT (SUPRAVENTRICULAR TACHYCARDIA) (HCC): Status: ACTIVE | Noted: 2022-08-19

## 2022-08-19 PROCEDURE — 93248 EXT ECG>7D<15D REV&INTERPJ: CPT | Performed by: INTERNAL MEDICINE

## 2022-08-21 ENCOUNTER — TELEPHONE (OUTPATIENT)
Dept: MEDICAL GROUP | Facility: MEDICAL CENTER | Age: 72
End: 2022-08-21
Payer: MEDICARE

## 2022-08-21 DIAGNOSIS — I47.10 SVT (SUPRAVENTRICULAR TACHYCARDIA) (HCC): ICD-10-CM

## 2022-08-22 NOTE — TELEPHONE ENCOUNTER
Notified the patient with results, recommend consider metoprolol or beta-blocker for episodes of SVT, patient states he has not had any palpitations or near syncope and he would like to hold off on medication for now.  Advised him to continue to check his blood pressure and heart rate regularly.  We will contact him to schedule a follow-up.  Echo pending.  Should he develop any palpitations, lightheadedness let me know, also continue minimize caffeine.

## 2022-08-26 ENCOUNTER — TELEPHONE (OUTPATIENT)
Dept: MEDICAL GROUP | Facility: MEDICAL CENTER | Age: 72
End: 2022-08-26
Payer: MEDICARE

## 2022-08-26 NOTE — TELEPHONE ENCOUNTER
Called and tried to LM. No response when pt picked up the telephone. Tried again and it just rang.

## 2022-08-26 NOTE — TELEPHONE ENCOUNTER
Roland Hernández  [phone] (home)     Pt called and LM. States that his dtr has tested POS for Covid and would like you to send Rx for anti viral to pharmacy for her.     Dtr is 39 YO and her name is Kya. Not vacinated. Smoker. Has no PCP.    I know this is highly unusual, just forwarding his message.

## 2022-08-29 ENCOUNTER — OFFICE VISIT (OUTPATIENT)
Dept: MEDICAL GROUP | Facility: MEDICAL CENTER | Age: 72
End: 2022-08-29
Payer: MEDICARE

## 2022-08-29 VITALS
WEIGHT: 209.8 LBS | BODY MASS INDEX: 26.08 KG/M2 | DIASTOLIC BLOOD PRESSURE: 76 MMHG | HEART RATE: 86 BPM | OXYGEN SATURATION: 95 % | TEMPERATURE: 98 F | SYSTOLIC BLOOD PRESSURE: 134 MMHG | HEIGHT: 75 IN

## 2022-08-29 DIAGNOSIS — I47.10 PAROXYSMAL SVT (SUPRAVENTRICULAR TACHYCARDIA) (HCC): ICD-10-CM

## 2022-08-29 DIAGNOSIS — E78.5 DYSLIPIDEMIA: ICD-10-CM

## 2022-08-29 DIAGNOSIS — E04.1 THYROID NODULE: ICD-10-CM

## 2022-08-29 PROCEDURE — 99214 OFFICE O/P EST MOD 30 MIN: CPT | Performed by: INTERNAL MEDICINE

## 2022-08-29 RX ORDER — METOPROLOL SUCCINATE 25 MG/1
25 TABLET, EXTENDED RELEASE ORAL DAILY
Qty: 30 TABLET | Refills: 2 | Status: SHIPPED | OUTPATIENT
Start: 2022-08-29 | End: 2022-08-29

## 2022-08-29 ASSESSMENT — FIBROSIS 4 INDEX: FIB4 SCORE: 2.18

## 2022-08-29 NOTE — PROGRESS NOTES
Subjective     Hadley Hernández is a 71 y.o. male who presents with Follow-Up            HPI      Here for follow up symptoms from 7/25 had been having first morning episodes feet tingling sensation, no nausea since july, occasional lightheadedness lasting a few minutes, no associated chest pain, no palpitations, no heart skipping sensation.  Patient has not been exercising regularly because of the heat outside.  But he does keep active.  Has not noticed any palpitations, lightheadedness, chest pain with activity.  Coffee one per day in the am, no sodas or diet sodas  Drinks water 40 to 80 oz per day to keep well-hydrated  Event monitor reviewed, 8/1/22 to 8/15/22 zio monitor average heart rate 83, maximum 190, minimum 53, no atrial fibrillation, 9 episodes of SVT fastest 20 beats maximum rate 190 longest lasting 15 seconds with average rate of 174, no heart block, no pauses, no ventricular tachycardia, less than 1% PAC and PVC burden.  No history of arrhythmia, no history of congestive heart failure.  Has been checking blood pressure multiple times per day 115/75 and heart rate 80s on average.  Recent labs CBC, CMP, TSH July 29 normal.  No history of hyperthyroid.  Medications, allergies, medical history, surgical history, social history, family history  reviewed and updated    Current Outpatient Medications   Medication Sig Dispense Refill    zolpidem (AMBIEN) 10 MG Tab       sildenafil citrate (VIAGRA) 100 MG tablet Take 1 Tablet by mouth as needed for Erectile Dysfunction. One per day 30 Tablet 3    atorvastatin (LIPITOR) 40 MG Tab TAKE 1 TABLET BY MOUTH EVERY DAY 90 Tablet 2     No current facility-administered medications for this visit.       thyroid nodule  2/10/20 ultrasound thyroid left nodule 1.1 x 0.9 x 1.0 cm TIRADS 6, repeat 1 year ultrasound  12/3/20 tsh 2.8  2/23/20 ultrasound thyroid 1.2 x 1.1 x 1.0 cm (previously 1.1 x 0.9 x 1.0 cm) left lower pole thyroid isoechoic solid mass TIRADS 3,  mildly suspicious has enlarged slightly, recommend repeat ultrasound 1 year  12/10/21 ultrasound thyroid left lower nodule 1.0 x 1.1 x 1.1 cm (previously 1.2 x 1.1 x 1.0 cm)  12/21/21 tsh 4.3  7/29/22 tsh 3.2    svt  7/25/22 symptoms of body tingling and nausea first awakening in the morning, does not seem to have the rest of the day, also has had at least 1 episode of some lightheadedness when going from sitting to standing, EKG ordered, echo, event monitor x2 weeks, labs, overnight pulse oximetry for stopping breathing at night per wife to [facility]  8/1/22 to 8/15/22 zio monitor average heart rate 83, maximum 190, minimum 53, no atrial fibrillation, 9 episodes of SVT fastest 20 beats maximum rate 190 longest lasting 15 seconds with average rate of 174, no heart block, no pauses, no ventricular tachycardia, less than 1% PAC and PVC burden  8/21/22 patient declines beta blocker for now since no palpitations     Schatzki's ring  11/14/12 EGD per [facility], schatzki's ring, gastritis, 57 french dilation     Status post appendectomy     prev health  4/19/13 tdap  10/27/15 prevnar  11/13/17 colon per [facility] diverticulosis, otherwise negative repeat 10 years  9/18/18 pneumovax  9/30/19 shingrix second in series   10/22/19 hep c ab negative  5/16/22 covid booster  7/29/22 vit d 88  7/29/22 psa<0.02     insomnia  11/19/20 difficulty with sleep maintenance, has tried melatonin and ambien, continue ambien as needed     Impaired fasting blood sugar  6/7/10 A1c 6.0%, bs 109  12/5/11 A1c 5.8%, bs 146  9/26/13 A1c 6.1%,bs 140  12/5/14 A1c 5.8%,bs 111  12/5/15 A1c 5.9% done at [facility] benefit plan  10/17/17 A1c 5.5% per [facility] plan  6/7/18 A1c 5.5%  10/22/19 bs 114  12/3/20 A1c 5.6%  2/15/21 A1c 5.8%  7/29/22 A1c 5.6%     History tobacco  Hx of tobacco quit 2010 after smoking 30 yrs     histoyr of shoulder pain  5/29/18 refer to [facility] performance phone [phone], fax [phone]  6/7/18 right shoulder x-ray arthritis proceed with physical  therapy  6/7/18 [facility] PT note     History of prostate cancer  2003 diagnosed with prostate cancer s/p radical prostactomy   4/14/04 psa <0.1  11/5/08 psa <0.1  6/7/10 psa <0.1  12/5/11 psa <0.1  9/26/13 psa <0.01  12/5/14 psa <0.1  12/5/15 psa 0.1 done at [facility] benefit plan  10/17/17 psa<0.1 per [facility] plan  6/7/18 psa<0.01  10/22/19 psa<0.01  12/3/20 psa<0.02  12/21/21 psa<0.02  7/29/22 psa<0.02     Dyslipidemia  10/17/04 chol 230,trig 67,hdl 63,ldl 154  11/5/08 chol 243,trig 69,hdl 63,ldl 166  2010 started on niacin 2000 mg daily  6/7/10 chol 234,trig 114,hdl 63,ldl 148  12/5/11 chol 198,trig 96,hdl 57,ldl 122,crp 0.8  9/26/13 chol 249,trig 143,hdl 53,ldl 167,LDL-P 2088,HDL-P 42,LP-IR 70  12/5/14 chol 214,trig 62,hdl 74,ldl 128,crp 0.8  12/5/15 chol 190,trig 62,hdl 64,ldl 114 done at [facility] benefit plan  10/17/17 chol 250,trig 134,hdl 58,ldl 165 per [facility]  6/7/18 chol 218,trig 59,hdl 65,ldl 141  10/22/19 chol 253,trig 93,hdl 72,ldl 162 offered statin  10/29/19 patient declines statin  12/3/20 chol 240,trig 101,hdl 66,ldl 154; 10 year risk 19% declines statin  12/17/20 CT cardiac calcium score LMA 80.4, LCx 0.0, LAD 80.6, .4 = 302.4  12/17/20 start lipitor 40 mg repeat labs 4 weeks  2/15/21 chol 162,trig 161,hdl 70,ldl 60 on lipitor 40 mg  2/23/21 chol 163,trig 75,hdl 75,ldl 73 on lipitor 40 mg  10/26/21 chol 172,trig 69,hdl 85,ldl 73 on lipitor 40 mg  7/29/22 chol 159,trig 72,hdl 71,ldl 74 on lipitor 40 mg               Patient Active Problem List   Diagnosis    Dyslipidemia    Erectile dysfunction    History of prostate cancer    History of tobacco abuse    S/P appendectomy    Preventative health care    Schatzki's ring    Impaired fasting glucose    History of shoulder pain    Thyroid nodule    Insomnia    SVT (supraventricular tachycardia) (HCC)              Patient Care Team:  Marcell Martinez M.D. as PCP - General (Internal Medicine)      ROS           Objective          Physical  Exam  Vitals and nursing note reviewed.   Constitutional:       Appearance: Normal appearance.   HENT:      Head: Normocephalic and atraumatic.      Right Ear: External ear normal.      Left Ear: External ear normal.   Eyes:      Conjunctiva/sclera: Conjunctivae normal.   Cardiovascular:      Rate and Rhythm: Normal rate and regular rhythm.      Heart sounds: Normal heart sounds.   Pulmonary:      Effort: Pulmonary effort is normal.      Breath sounds: Normal breath sounds.   Abdominal:      General: There is no distension.   Skin:     Findings: No bruising.   Neurological:      General: No focal deficit present.      Mental Status: He is alert.   Psychiatric:         Mood and Affect: Mood normal.         Behavior: Behavior normal.                           Assessment & Plan        Assessment  #1 paroxysmal SVT, by event monitor August 1 through August 15, 9 episodes of SVT fastest 20 beats rate 190 longest 15 seconds, patient does not really describe palpitations or heart skipping sensation, he will occasionally feel lightheadedness typically in the mornings with some tingling sensation in his feet, unclear if that is related to the SVT, he has been checking his blood pressure and heart rate regularly with no documented tachycardia.  Recent TSH was normal.  This does not seem to be related to stress or caffeine intake or activity.     #2 dyslipidemia on Lipitor 7/29/22 chol 159,trig 72,hdl 71,ldl 74 on lipitor 40 mg    #3 thyroid nodule by ultrasound 12/10/21 ultrasound thyroid left lower nodule 1.0 x 1.1 x 1.1 cm (previously 1.2 x 1.1 x 1.0 cm), normal TSH of 3.2 in July 29 with no previous history of hyperthyroid    Plan  #1 Echo pending    #2 start metoprolol extended release 25 mg once a day, check blood pressure and heart rate regularly, medication may cause low blood pressure, bradycardia, lightheadedness, fatigue    #3 Ensure adequate hydration    #4 send me an update on blood pressure, heart rate, and  symptoms in 2 weeks    #5 reviewed event monitor result with him    #6 resume regular exercise weather permitting    #7 continue limit caffeine    #8 due for thyroid ultrasound end of the year    #9 follow-up 3 months as he will be due for his wellness visit in November

## 2022-08-31 ENCOUNTER — TELEPHONE (OUTPATIENT)
Dept: MEDICAL GROUP | Facility: MEDICAL CENTER | Age: 72
End: 2022-08-31
Payer: MEDICARE

## 2022-09-01 NOTE — TELEPHONE ENCOUNTER
Notified nocturnal pulse oximetry results from 8/27 pulse oximeter done through [employer] overnight through [employer], average saturation 90%, time less than 88% 181 minutes, jose 69%, recommend sleep apnea evaluation.  Patient declined states he would not use a CPAP machine right now, explained to him that low oxygen levels can increase risk for cardiac disease and event my recommendation for further testing.  Patient understands my recommendation for further testing but still declined sleep apnea evaluation.

## 2022-09-08 ENCOUNTER — OFFICE VISIT (OUTPATIENT)
Dept: MEDICAL GROUP | Facility: MEDICAL CENTER | Age: 72
End: 2022-09-08
Payer: MEDICARE

## 2022-09-08 VITALS
HEART RATE: 66 BPM | HEIGHT: 75 IN | SYSTOLIC BLOOD PRESSURE: 128 MMHG | WEIGHT: 211 LBS | DIASTOLIC BLOOD PRESSURE: 62 MMHG | BODY MASS INDEX: 26.24 KG/M2 | OXYGEN SATURATION: 95 % | TEMPERATURE: 97.7 F

## 2022-09-08 DIAGNOSIS — G47.30 SLEEP DISORDER BREATHING: ICD-10-CM

## 2022-09-08 DIAGNOSIS — E04.1 THYROID NODULE: ICD-10-CM

## 2022-09-08 DIAGNOSIS — E78.5 DYSLIPIDEMIA: ICD-10-CM

## 2022-09-08 DIAGNOSIS — I47.10 PAROXYSMAL SVT (SUPRAVENTRICULAR TACHYCARDIA) (HCC): ICD-10-CM

## 2022-09-08 PROCEDURE — 99214 OFFICE O/P EST MOD 30 MIN: CPT | Performed by: INTERNAL MEDICINE

## 2022-09-08 RX ORDER — METOPROLOL SUCCINATE 25 MG/1
25 TABLET, EXTENDED RELEASE ORAL DAILY
Qty: 90 TABLET | Refills: 3 | Status: SHIPPED | OUTPATIENT
Start: 2022-09-08 | End: 2022-09-08 | Stop reason: SDUPTHER

## 2022-09-08 RX ORDER — METOPROLOL SUCCINATE 25 MG/1
25 TABLET, EXTENDED RELEASE ORAL DAILY
Qty: 90 TABLET | Refills: 3 | Status: SHIPPED | OUTPATIENT
Start: 2022-09-08 | End: 2023-10-02

## 2022-09-08 ASSESSMENT — FIBROSIS 4 INDEX: FIB4 SCORE: 2.18

## 2022-09-08 NOTE — PROGRESS NOTES
"Subjective     Hadley Roverto Hernández is a 71 y.o. male who presents with medication review Follow-Up            HPI  Patient is here for follow-up, started metoprolol end of last month, 25 mg once a day for SVT, patient has been having symptoms of tingling in the morning, event monitor performed 3 episodes of SVT patient really had no palpitations, perhaps experienced a bit of lightheadedness at times, that has not recurred sisterly Metroprolol.  Blood pressures are stable running systolics 120s, diastolics 70s to 80s, heart rate running 70s to 80s as well no lightheadedness or dizziness on the low-dose metoprolol.  Patient has not been exercising because of the heat outdoors.  Dyslipidemia on Lipitor.          Current Outpatient Medications   Medication Sig Dispense Refill    metoprolol SR (TOPROL XL) 25 MG TABLET SR 24 HR Take 1 Tablet by mouth every day. 90 Tablet 3    zolpidem (AMBIEN) 10 MG Tab       sildenafil citrate (VIAGRA) 100 MG tablet Take 1 Tablet by mouth as needed for Erectile Dysfunction. One per day 30 Tablet 3    atorvastatin (LIPITOR) 40 MG Tab TAKE 1 TABLET BY MOUTH EVERY DAY 90 Tablet 2     No current facility-administered medications for this visit.     Patient Active Problem List   Diagnosis    Dyslipidemia    Erectile dysfunction    History of prostate cancer    History of tobacco abuse    S/P appendectomy    Preventative health care    [employer]'s nikkie    Impaired fasting glucose    History of shoulder pain    Thyroid nodule    Insomnia    Paroxysmal SVT (supraventricular tachycardia) (HCC)         ROS           Objective     /62 (BP Location: Right arm, Patient Position: Sitting, BP Cuff Size: Adult)   Pulse 66   Temp 36.5 °C (97.7 °F)   Ht 1.905 m (6' 3\")   Wt 95.7 kg (211 lb)   SpO2 95%   BMI 26.37 kg/m²      Physical Exam  Vitals and nursing note reviewed.   Constitutional:       Appearance: Normal appearance.   HENT:      Head: Normocephalic and atraumatic.      Right Ear: " External ear normal.      Left Ear: External ear normal.   Eyes:      Conjunctiva/sclera: Conjunctivae normal.   Cardiovascular:      Rate and Rhythm: Normal rate.      Heart sounds: Normal heart sounds.   Pulmonary:      Effort: Pulmonary effort is normal.      Breath sounds: Normal breath sounds.   Abdominal:      General: There is no distension.   Musculoskeletal:         General: No swelling.   Skin:     Findings: No bruising.   Neurological:      Mental Status: He is alert.   Psychiatric:         Mood and Affect: Mood normal.         Behavior: Behavior normal.               Assessment & Plan        Assessment  #1 SVT by event monitor August 1 through August 15 showing 9 episodes of SVT, no recurrence of symptoms a started metoprolol 25 mg daily    #2 dyslipidemia on Lipitor 40 mg daily most recent cholesterol in July total 159, HDL 71, LDL 74    #3 thyroid nodule by ultrasound a year ago, normal TSH    #4 sleep disordered breathing 8/27/22 OPO by [facility] average 90% time less than 88% jose 69% declines sleep apnea evaluation      Plan  #1 continue metoprolol 25 mg daily, check blood pressure periodically monitor for low blood pressure, if systolic less than 110 consistently also monitor for low heart rate if less than 70 consistently to let me know, orthostatic precautions, when he starts exercising this week, ensure adequate hydration with water    #2 refill Metroprolol sent to his pharmacy    #3 continue Lipitor    #4 thyroid ultrasound due in December we will order that test    #5 echo pending in November    #6 patient has declined sleep evaluation for sleep disordered breathing and mild decrease in saturation at night

## 2022-11-08 ENCOUNTER — PATIENT MESSAGE (OUTPATIENT)
Dept: HEALTH INFORMATION MANAGEMENT | Facility: OTHER | Age: 72
End: 2022-11-08

## 2022-11-11 ENCOUNTER — HOSPITAL ENCOUNTER (OUTPATIENT)
Dept: CARDIOLOGY | Facility: MEDICAL CENTER | Age: 72
End: 2022-11-11
Attending: INTERNAL MEDICINE
Payer: MEDICARE

## 2022-11-11 DIAGNOSIS — R42 LIGHTHEADED: ICD-10-CM

## 2022-11-11 LAB — LV EJECT FRACT  99904: 60

## 2022-11-11 PROCEDURE — 93306 TTE W/DOPPLER COMPLETE: CPT | Mod: 26 | Performed by: INTERNAL MEDICINE

## 2022-11-11 PROCEDURE — 93306 TTE W/DOPPLER COMPLETE: CPT

## 2022-11-13 ENCOUNTER — TELEPHONE (OUTPATIENT)
Dept: MEDICAL GROUP | Facility: MEDICAL CENTER | Age: 72
End: 2022-11-13
Payer: MEDICARE

## 2022-11-14 ENCOUNTER — TELEPHONE (OUTPATIENT)
Dept: MEDICAL GROUP | Facility: MEDICAL CENTER | Age: 72
End: 2022-11-14
Payer: MEDICARE

## 2022-11-14 NOTE — TELEPHONE ENCOUNTER
Called the patient and left a message.  Please notify him that his heart ultrasound shows normal heart muscle function.

## 2022-11-14 NOTE — TELEPHONE ENCOUNTER
----- Message from Marcell Martinez M.D. sent at 11/13/2022  4:56 PM PST -----  Called the patient and left a message.  Please notify him that his heart ultrasound shows normal heart muscle function.

## 2022-12-05 SDOH — ECONOMIC STABILITY: TRANSPORTATION INSECURITY
IN THE PAST 12 MONTHS, HAS LACK OF RELIABLE TRANSPORTATION KEPT YOU FROM MEDICAL APPOINTMENTS, MEETINGS, WORK OR FROM GETTING THINGS NEEDED FOR DAILY LIVING?: NO

## 2022-12-05 SDOH — ECONOMIC STABILITY: INCOME INSECURITY: HOW HARD IS IT FOR YOU TO PAY FOR THE VERY BASICS LIKE FOOD, HOUSING, MEDICAL CARE, AND HEATING?: NOT HARD AT ALL

## 2022-12-05 SDOH — ECONOMIC STABILITY: FOOD INSECURITY: WITHIN THE PAST 12 MONTHS, YOU WORRIED THAT YOUR FOOD WOULD RUN OUT BEFORE YOU GOT MONEY TO BUY MORE.: NEVER TRUE

## 2022-12-05 SDOH — ECONOMIC STABILITY: INCOME INSECURITY: IN THE LAST 12 MONTHS, WAS THERE A TIME WHEN YOU WERE NOT ABLE TO PAY THE MORTGAGE OR RENT ON TIME?: NO

## 2022-12-05 SDOH — ECONOMIC STABILITY: HOUSING INSECURITY
IN THE LAST 12 MONTHS, WAS THERE A TIME WHEN YOU DID NOT HAVE A STEADY PLACE TO SLEEP OR SLEPT IN A SHELTER (INCLUDING NOW)?: NO

## 2022-12-05 SDOH — ECONOMIC STABILITY: FOOD INSECURITY: WITHIN THE PAST 12 MONTHS, THE FOOD YOU BOUGHT JUST DIDN'T LAST AND YOU DIDN'T HAVE MONEY TO GET MORE.: NEVER TRUE

## 2022-12-05 SDOH — HEALTH STABILITY: MENTAL HEALTH
STRESS IS WHEN SOMEONE FEELS TENSE, NERVOUS, ANXIOUS, OR CAN'T SLEEP AT NIGHT BECAUSE THEIR MIND IS TROUBLED. HOW STRESSED ARE YOU?: ONLY A LITTLE

## 2022-12-05 SDOH — ECONOMIC STABILITY: TRANSPORTATION INSECURITY
IN THE PAST 12 MONTHS, HAS THE LACK OF TRANSPORTATION KEPT YOU FROM MEDICAL APPOINTMENTS OR FROM GETTING MEDICATIONS?: NO

## 2022-12-05 SDOH — ECONOMIC STABILITY: HOUSING INSECURITY: IN THE LAST 12 MONTHS, HOW MANY PLACES HAVE YOU LIVED?: 1

## 2022-12-05 SDOH — HEALTH STABILITY: PHYSICAL HEALTH: ON AVERAGE, HOW MANY MINUTES DO YOU ENGAGE IN EXERCISE AT THIS LEVEL?: 30 MIN

## 2022-12-05 SDOH — ECONOMIC STABILITY: TRANSPORTATION INSECURITY
IN THE PAST 12 MONTHS, HAS LACK OF TRANSPORTATION KEPT YOU FROM MEETINGS, WORK, OR FROM GETTING THINGS NEEDED FOR DAILY LIVING?: NO

## 2022-12-05 SDOH — HEALTH STABILITY: PHYSICAL HEALTH: ON AVERAGE, HOW MANY DAYS PER WEEK DO YOU ENGAGE IN MODERATE TO STRENUOUS EXERCISE (LIKE A BRISK WALK)?: 4 DAYS

## 2022-12-05 ASSESSMENT — SOCIAL DETERMINANTS OF HEALTH (SDOH)
HOW OFTEN DO YOU ATTENT MEETINGS OF THE CLUB OR ORGANIZATION YOU BELONG TO?: NEVER
HOW OFTEN DO YOU HAVE SIX OR MORE DRINKS ON ONE OCCASION: LESS THAN MONTHLY
HOW OFTEN DO YOU ATTEND CHURCH OR RELIGIOUS SERVICES?: NEVER
DO YOU BELONG TO ANY CLUBS OR ORGANIZATIONS SUCH AS CHURCH GROUPS UNIONS, FRATERNAL OR ATHLETIC GROUPS, OR SCHOOL GROUPS?: NO
HOW OFTEN DO YOU GET TOGETHER WITH FRIENDS OR RELATIVES?: TWICE A WEEK
IN A TYPICAL WEEK, HOW MANY TIMES DO YOU TALK ON THE PHONE WITH FAMILY, FRIENDS, OR NEIGHBORS?: MORE THAN THREE TIMES A WEEK
IN A TYPICAL WEEK, HOW MANY TIMES DO YOU TALK ON THE PHONE WITH FAMILY, FRIENDS, OR NEIGHBORS?: MORE THAN THREE TIMES A WEEK
HOW OFTEN DO YOU ATTEND CHURCH OR RELIGIOUS SERVICES?: NEVER
HOW MANY DRINKS CONTAINING ALCOHOL DO YOU HAVE ON A TYPICAL DAY WHEN YOU ARE DRINKING: 3 OR 4
HOW OFTEN DO YOU HAVE A DRINK CONTAINING ALCOHOL: 4 OR MORE TIMES A WEEK
HOW OFTEN DO YOU GET TOGETHER WITH FRIENDS OR RELATIVES?: TWICE A WEEK
HOW HARD IS IT FOR YOU TO PAY FOR THE VERY BASICS LIKE FOOD, HOUSING, MEDICAL CARE, AND HEATING?: NOT HARD AT ALL
HOW OFTEN DO YOU ATTENT MEETINGS OF THE CLUB OR ORGANIZATION YOU BELONG TO?: NEVER
WITHIN THE PAST 12 MONTHS, YOU WORRIED THAT YOUR FOOD WOULD RUN OUT BEFORE YOU GOT THE MONEY TO BUY MORE: NEVER TRUE
DO YOU BELONG TO ANY CLUBS OR ORGANIZATIONS SUCH AS CHURCH GROUPS UNIONS, FRATERNAL OR ATHLETIC GROUPS, OR SCHOOL GROUPS?: NO

## 2022-12-05 ASSESSMENT — LIFESTYLE VARIABLES
HOW OFTEN DO YOU HAVE SIX OR MORE DRINKS ON ONE OCCASION: LESS THAN MONTHLY
HOW MANY STANDARD DRINKS CONTAINING ALCOHOL DO YOU HAVE ON A TYPICAL DAY: 3 OR 4
HOW OFTEN DO YOU HAVE A DRINK CONTAINING ALCOHOL: 4 OR MORE TIMES A WEEK
AUDIT-C TOTAL SCORE: 6
SKIP TO QUESTIONS 9-10: 0

## 2022-12-06 ENCOUNTER — OFFICE VISIT (OUTPATIENT)
Dept: MEDICAL GROUP | Facility: MEDICAL CENTER | Age: 72
End: 2022-12-06
Payer: MEDICARE

## 2022-12-06 VITALS
OXYGEN SATURATION: 96 % | WEIGHT: 219 LBS | HEIGHT: 75 IN | TEMPERATURE: 97.5 F | SYSTOLIC BLOOD PRESSURE: 136 MMHG | HEART RATE: 73 BPM | DIASTOLIC BLOOD PRESSURE: 82 MMHG | BODY MASS INDEX: 27.23 KG/M2

## 2022-12-06 DIAGNOSIS — E04.1 THYROID NODULE: ICD-10-CM

## 2022-12-06 DIAGNOSIS — L02.92 FURUNCLE: ICD-10-CM

## 2022-12-06 DIAGNOSIS — M79.644 THUMB PAIN, RIGHT: ICD-10-CM

## 2022-12-06 DIAGNOSIS — R07.89 CHEST WALL PAIN: ICD-10-CM

## 2022-12-06 PROCEDURE — 99214 OFFICE O/P EST MOD 30 MIN: CPT | Performed by: INTERNAL MEDICINE

## 2022-12-06 ASSESSMENT — FIBROSIS 4 INDEX: FIB4 SCORE: 2.21

## 2022-12-06 NOTE — PROGRESS NOTES
Subjective     Hadley Hernández is a 72 y.o. male who presents with Active Symptoms (/R thumb pain/ rib pain since fall on [employer] [place])            HPI    On veterans day was walking to mailbox slipped and fell backwards and landed on right side and right thumb to brace himself, no head trauma or loss of consciousness.  Since the fall patient has had some swelling and pain over the right first knuckle of his thumb area, right-handed male, no sensory changes.  No significant decrease in strength unless he is trying to open a bottle or jar, has been taking Motrin twice a day as needed until 2 weeks or so ago none since then, when he had been taking the Motrin no GI upset.  No right elbow or right shoulder pain.  Also has had some discomfort in the right rib area improving, occasional worse with deep inspiration or twisting or movement.  Did not notice any bruising over the area, no history of pneumothorax, no shortness of breath, no cough.  Also had a boil on his left upper back that his wife lanced 2 weeks ago and has been cleaning that area with peroxide.  No history of recurrent skin infections.  No fevers or chills.             Current Outpatient Medications   Medication Sig Dispense Refill    atorvastatin (LIPITOR) 40 MG Tab TAKE 1 TABLET BY MOUTH EVERY DAY 90 Tablet 3    metoprolol SR (TOPROL XL) 25 MG TABLET SR 24 HR Take 1 Tablet by mouth every day. 90 Tablet 3    zolpidem (AMBIEN) 10 MG Tab       sildenafil citrate (VIAGRA) 100 MG tablet Take 1 Tablet by mouth as needed for Erectile Dysfunction. One per day 30 Tablet 3     No current facility-administered medications for this visit.                      thyroid nodule  2/10/20 ultrasound thyroid left nodule 1.1 x 0.9 x 1.0 cm TIRADS 6, repeat 1 year ultrasound  12/3/20 tsh 2.8  2/23/20 ultrasound thyroid 1.2 x 1.1 x 1.0 cm (previously 1.1 x 0.9 x 1.0 cm) left lower pole thyroid isoechoic solid mass TIRADS 3, mildly suspicious has enlarged slightly,  recommend repeat ultrasound 1 year  12/10/21 ultrasound thyroid left lower nodule 1.0 x 1.1 x 1.1 cm (previously 1.2 x 1.1 x 1.0 cm)  12/21/21 tsh 4.3  7/29/22 tsh 3.2     psvt  7/25/22 symptoms of body tingling and nausea first awakening in the morning, does not seem to have the rest of the day, also has had at least 1 episode of some lightheadedness when going from sitting to standing, EKG ordered, echo, event monitor x2 weeks, labs, overnight pulse oximetry for stopping breathing at night per wife to [facility]  8/1/22 to 8/15/22 zio monitor average heart rate 83, maximum 190, minimum 53, no atrial fibrillation, 9 episodes of SVT fastest 20 beats maximum rate 190 longest lasting 15 seconds with average rate of 174, no heart block, no pauses, no ventricular tachycardia, less than 1% PAC and PVC burden  8/21/22 patient declines beta blocker for now since no palpitations  8/27/22 OPO by [facility] average 90% time less than 88% jose 69% declines sleep apnea evaluation   8/29/22 start metoprolol 25 mg   11/11/22 echo normal LV function, EF 60 to 65%, RVSP 30, mild MR     Schatzki's ring  11/14/12 EGD per [facility], schatzki's ring, gastritis, 57 french dilation    sleep disordered breathing  8/27/22 OPO by [facility] average 90% time less than 88% jose 69% declines sleep apnea evaluation      Status post appendectomy     prev health  4/19/13 tdap  10/27/15 prevnar  11/13/17 colon per [facility] diverticulosis, otherwise negative repeat 10 years  9/18/18 pneumovax  9/30/19 shingrix second in series   10/22/19 hep c ab negative  5/16/22 covid booster  7/29/22 vit d 88  7/29/22 psa<0.02  9/20/22 flu     insomnia  11/19/20 difficulty with sleep maintenance, has tried melatonin and ambien, continue ambien as needed     Impaired fasting blood sugar  6/7/10 A1c 6.0%, bs 109  12/5/11 A1c 5.8%, bs 146  9/26/13 A1c 6.1%,bs 140  12/5/14 A1c 5.8%,bs 111  12/5/15 A1c 5.9% done at [facility] benefit plan  10/17/17 A1c 5.5% per [facility] plan  6/7/18 A1c  5.5%  10/22/19 bs 114  12/3/20 A1c 5.6%  2/15/21 A1c 5.8%  7/29/22 A1c 5.6%     History tobacco  Hx of tobacco quit 2010 after smoking 30 yrs     histoyr of shoulder pain  5/29/18 refer to [facility] phone [phone], fax [phone]  6/7/18 right shoulder x-ray arthritis proceed with physical therapy  6/7/18 [facility] PT note     History of prostate cancer  2003 diagnosed with prostate cancer s/p radical prostactomy   4/14/04 psa <0.1  11/5/08 psa <0.1  6/7/10 psa <0.1  12/5/11 psa <0.1  9/26/13 psa <0.01  12/5/14 psa <0.1  12/5/15 psa 0.1 done at [facility] benefit plan  10/17/17 psa<0.1 per [facility] plan  6/7/18 psa<0.01  10/22/19 psa<0.01  12/3/20 psa<0.02  12/21/21 psa<0.02  7/29/22 psa<0.02     Dyslipidemia  10/17/04 chol 230,trig 67,hdl 63,ldl 154  11/5/08 chol 243,trig 69,hdl 63,ldl 166  2010 started on niacin 2000 mg daily  6/7/10 chol 234,trig 114,hdl 63,ldl 148  12/5/11 chol 198,trig 96,hdl 57,ldl 122,crp 0.8  9/26/13 chol 249,trig 143,hdl 53,ldl 167,LDL-P 2088,HDL-P 42,LP-IR 70  12/5/14 chol 214,trig 62,hdl 74,ldl 128,crp 0.8  12/5/15 chol 190,trig 62,hdl 64,ldl 114 done at [facility] benefit plan  10/17/17 chol 250,trig 134,hdl 58,ldl 165 per [facility] plan  6/7/18 chol 218,trig 59,hdl 65,ldl 141  10/22/19 chol 253,trig 93,hdl 72,ldl 162 offered statin  10/29/19 patient declines statin  12/3/20 chol 240,trig 101,hdl 66,ldl 154; 10 year risk 19% declines statin  12/17/20 CT cardiac calcium score LMA 80.4, LCx 0.0, LAD 80.6, .4 = 302.4  12/17/20 start lipitor 40 mg repeat labs 4 weeks  2/15/21 chol 162,trig 161,hdl 70,ldl 60 on lipitor 40 mg  2/23/21 chol 163,trig 75,hdl 75,ldl 73 on lipitor 40 mg  10/26/21 chol 172,trig 69,hdl 85,ldl 73 on lipitor 40 mg  7/29/22 chol 159,trig 72,hdl 71,ldl 74 on lipitor 40 mg       Patient Active Problem List   Diagnosis    Dyslipidemia    Erectile dysfunction    History of prostate cancer    History of tobacco abuse    S/P appendectomy    Preventative health care     "Schatzki's ring    Impaired fasting glucose    History of shoulder pain    Thyroid nodule    Insomnia    Paroxysmal SVT (supraventricular tachycardia) (HCC)    Sleep disorder breathing                  Patient Care Team:  Marcell Martinez M.D. as PCP - General (Internal Medicine)      ROS           Objective     /82 (BP Location: Right arm, Patient Position: Sitting, BP Cuff Size: Adult)   Pulse 73   Temp 36.4 °C (97.5 °F)   Ht 1.905 m (6' 3\")   Wt 99.3 kg (219 lb)   SpO2 96%   BMI 27.37 kg/m²      Physical Exam  Vitals and nursing note reviewed.   Constitutional:       General: He is not in acute distress.     Appearance: Normal appearance.   HENT:      Head: Normocephalic and atraumatic.      Right Ear: External ear normal.      Left Ear: External ear normal.   Eyes:      Conjunctiva/sclera: Conjunctivae normal.   Cardiovascular:      Rate and Rhythm: Normal rate and regular rhythm.      Heart sounds: Normal heart sounds.   Pulmonary:      Effort: Pulmonary effort is normal.      Breath sounds: Normal breath sounds.   Abdominal:      General: There is no distension.   Musculoskeletal:         General: Swelling present.   Skin:     Findings: No bruising.   Neurological:      General: No focal deficit present.      Mental Status: He is alert.   Psychiatric:         Mood and Affect: Mood normal.         Behavior: Behavior normal.   Right shoulder normal abduction, extension and flexion, right elbow normal flexion extension, right hand normal , normal sensation all fingers including right thumb, some swelling of the right proximal thumb area and tenderness at the DIP right thumb with prominence at that joint, no obvious dislocation, some tenderness to palpation  Right anterior thoracic rib cage area no crepitus, no rub, mild tenderness to palpation over the lateral aspect right chest wall, good breath sounds bilateral, no flail chest, no pleural or pericardial friction rub  Left upper back area " evidence of draining furuncle previously lanced, no fluid collection currently, area is healing well, no induration, no erythema, no discharge, nontender to palpation             Assessment & Plan        Assessment  #1 recent fall with right rib cage costochondritis, normal breath sounds to auscultation and normal saturation on room air, no evidence of pneumothorax    #2 right thumb injury DIP joint tenderness and prominence, unlikely to be dislocation but still has some pain and swelling in the area although it is improving     #3 recent left upper back furuncle lanced by his wife healing well, no evidence of secondary infection    #4 thyroid nodule 12/10/21 ultrasound thyroid left lower nodule 1.0 x 1.1 x 1.1 cm (previously 1.2 x 1.1 x 1.0 cm)      Plan  #1 x-ray right thumb    #2 no need for x-ray chest or rib cage as he is healing and rib cage symptoms are improving after fall, lungs are clear, no evidence of pneumothorax, costochondritis should slowly improve, avoid heavy lifting, as well as twisting movements    #3 left upper back furuncle area that has been drained dressed, no need for oral antibiotics, monitor for any evidence of drainage, discharge, redness, should that occur to let us know    #4 we will call with x-ray results    #5 order for follow-up thyroid ultrasound placed in September patient has not yet been notified, will reprinted and he will get the ultrasound scheduled of his thyroid follow-up nodule

## 2022-12-08 ENCOUNTER — HOSPITAL ENCOUNTER (OUTPATIENT)
Dept: RADIOLOGY | Facility: MEDICAL CENTER | Age: 72
End: 2022-12-08
Attending: INTERNAL MEDICINE
Payer: MEDICARE

## 2022-12-08 ENCOUNTER — TELEPHONE (OUTPATIENT)
Dept: MEDICAL GROUP | Facility: MEDICAL CENTER | Age: 72
End: 2022-12-08
Payer: MEDICARE

## 2022-12-08 ENCOUNTER — PATIENT MESSAGE (OUTPATIENT)
Dept: MEDICAL GROUP | Facility: MEDICAL CENTER | Age: 72
End: 2022-12-08
Payer: MEDICARE

## 2022-12-08 DIAGNOSIS — E04.1 THYROID NODULE: ICD-10-CM

## 2022-12-08 DIAGNOSIS — M79.644 THUMB PAIN, RIGHT: ICD-10-CM

## 2022-12-08 PROBLEM — M19.049 ARTHRITIS OF FINGER: Status: ACTIVE | Noted: 2022-12-08

## 2022-12-08 PROCEDURE — 73140 X-RAY EXAM OF FINGER(S): CPT | Mod: RT

## 2022-12-08 PROCEDURE — 76536 US EXAM OF HEAD AND NECK: CPT

## 2022-12-09 NOTE — TELEPHONE ENCOUNTER
Notified with ultrasound thyroid result no change in nodule size or characteristics repeat 1 year.  Notified with finger x-ray no fracture or dislocation

## 2022-12-19 ENCOUNTER — OFFICE VISIT (OUTPATIENT)
Dept: MEDICAL GROUP | Facility: MEDICAL CENTER | Age: 72
End: 2022-12-19
Payer: MEDICARE

## 2022-12-19 VITALS
WEIGHT: 218 LBS | SYSTOLIC BLOOD PRESSURE: 158 MMHG | HEART RATE: 76 BPM | HEIGHT: 75 IN | BODY MASS INDEX: 27.1 KG/M2 | DIASTOLIC BLOOD PRESSURE: 90 MMHG | TEMPERATURE: 97.2 F | OXYGEN SATURATION: 96 %

## 2022-12-19 DIAGNOSIS — Z12.11 COLON CANCER SCREENING: ICD-10-CM

## 2022-12-19 DIAGNOSIS — Z23 NEED FOR DIPHTHERIA-TETANUS-PERTUSSIS (TDAP) VACCINE: ICD-10-CM

## 2022-12-19 DIAGNOSIS — Z00.00 MEDICARE ANNUAL WELLNESS VISIT, SUBSEQUENT: ICD-10-CM

## 2022-12-19 DIAGNOSIS — E78.5 DYSLIPIDEMIA: Chronic | ICD-10-CM

## 2022-12-19 DIAGNOSIS — R73.01 IMPAIRED FASTING GLUCOSE: Chronic | ICD-10-CM

## 2022-12-19 DIAGNOSIS — I47.10 PAROXYSMAL SVT (SUPRAVENTRICULAR TACHYCARDIA) (HCC): ICD-10-CM

## 2022-12-19 DIAGNOSIS — Z13.6 SCREENING FOR AAA (ABDOMINAL AORTIC ANEURYSM): ICD-10-CM

## 2022-12-19 DIAGNOSIS — Z00.00 PREVENTATIVE HEALTH CARE: Chronic | ICD-10-CM

## 2022-12-19 DIAGNOSIS — E55.9 VITAMIN D DEFICIENCY: ICD-10-CM

## 2022-12-19 PROCEDURE — 90471 IMMUNIZATION ADMIN: CPT | Performed by: INTERNAL MEDICINE

## 2022-12-19 PROCEDURE — G0439 PPPS, SUBSEQ VISIT: HCPCS | Performed by: INTERNAL MEDICINE

## 2022-12-19 PROCEDURE — 90715 TDAP VACCINE 7 YRS/> IM: CPT | Performed by: INTERNAL MEDICINE

## 2022-12-19 ASSESSMENT — ENCOUNTER SYMPTOMS: GENERAL WELL-BEING: GOOD

## 2022-12-19 ASSESSMENT — ACTIVITIES OF DAILY LIVING (ADL): BATHING_REQUIRES_ASSISTANCE: 0

## 2022-12-19 ASSESSMENT — FIBROSIS 4 INDEX: FIB4 SCORE: 2.21

## 2022-12-19 NOTE — PROGRESS NOTES
Subjective     Hadley Hernández is a 72 y.o. male who presents medicare wellness          HPI      Medicare wellness  Current supplements: Reviewed  Chronic narcotic pain medicines: No  Allergies:  reviewed  , normally walks but not as much during the winter months, tries to eat healthy lean along with fresh fruits and veggies, water 40-60 oz per day, no soda, no tobacco, etoh 1-3 champagne per day  Screening: Reviewed  Depressive Symptoms: Denies feeling down, depressed or hopeless. Denies loss of interest or pleasure in doing things   ADLs: Denies needing help with using telephone, transportation, shopping, preparing meals, housework, laundry, or managing medication or money.    Independent with bathing, hygiene, feeding, toileting, dressing    Memory concerns: Denies difficulty remembering details of conversations, events and upcoming appointments.  Hearing problems: Denies.   Recent falls once   Eye none recently  Teeth cleaning every three months, does brush and use waterpick  Hearing no changes  No chest pain or palpitations  No difficulty with memory   No mood changes  Driving no issues  Current social contact/activities: Reviewed  Blood pressure at home 120-130/70 normally, on metoprolol with no chest pain or palpitations    Current Outpatient Medications   Medication Sig Dispense Refill    atorvastatin (LIPITOR) 40 MG Tab TAKE 1 TABLET BY MOUTH EVERY DAY 90 Tablet 3    metoprolol SR (TOPROL XL) 25 MG TABLET SR 24 HR Take 1 Tablet by mouth every day. 90 Tablet 3    zolpidem (AMBIEN) 10 MG Tab       sildenafil citrate (VIAGRA) 100 MG tablet Take 1 Tablet by mouth as needed for Erectile Dysfunction. One per day 30 Tablet 3     No current facility-administered medications for this visit.           thyroid nodule  2/10/20 ultrasound thyroid left nodule 1.1 x 0.9 x 1.0 cm TIRADS 6, repeat 1 year ultrasound  12/3/20 tsh 2.8  2/23/20 ultrasound thyroid 1.2 x 1.1 x 1.0 cm (previously 1.1 x 0.9 x 1.0 cm)  left lower pole thyroid isoechoic solid mass TIRADS 3, mildly suspicious has enlarged slightly, recommend repeat ultrasound 1 year  12/10/21 ultrasound thyroid left lower nodule 1.0 x 1.1 x 1.1 cm (previously 1.2 x 1.1 x 1.0 cm)  12/21/21 tsh 4.3  7/29/22 tsh 3.2  12/8/22 ultrasound thyroid left lower 1.34 x 1.21 x 1.10 cm nodule (previously 1.1 x 1.1 x 1.0 cm), left lower 0.71 x 0.59 x 0.63 cm nodule, no significant change    thumb arthritis  12/8/22 x-ray right thumb, no evidence of fracture or dislocation, osteoarthritis first carpometacarpal articulation with small osteophyte    sleep disordered breathing  8/27/22 OPO by [facility] average 90% time less than 88% jose 69% declines sleep apnea evaluation      psvt  7/25/22 symptoms of body tingling and nausea first awakening in the morning, does not seem to have the rest of the day, also has had at least 1 episode of some lightheadedness when going from sitting to standing, EKG ordered, echo, event monitor x2 weeks, labs, overnight pulse oximetry for stopping breathing at night per wife to [facility]  8/1/22 to 8/15/22 zio monitor average heart rate 83, maximum 190, minimum 53, no atrial fibrillation, 9 episodes of SVT fastest 20 beats maximum rate 190 longest lasting 15 seconds with average rate of 174, no heart block, no pauses, no ventricular tachycardia, less than 1% PAC and PVC burden  8/21/22 patient declines beta blocker for now since no palpitations  8/27/22 OPO by [facility] average 90% time less than 88% jose 69% declines sleep apnea evaluation   8/29/22 start metoprolol 25 mg   11/11/22 echo normal LV function, EF 60 to 65%, RVSP 30, mild MR     Schatzki's ring  11/14/12 EGD per [facility], schatzki's ring, gastritis, 57 Czech dilation     sleep disordered breathing  8/27/22 OPO by [facility] average 90% time less than 88% jose 69% declines sleep apnea evaluation      Status post appendectomy     prev health  4/19/13 tdap  10/27/15 prevnar  11/13/17 colon per [facility]  diverticulosis, otherwise negative repeat 10 years  9/18/18 pneumovax  9/30/19 shingrix second in series   10/22/19 hep c ab negative  5/16/22 covid booster  7/29/22 vit d 88  7/29/22 psa<0.02  9/20/22 flu     insomnia  11/19/20 difficulty with sleep maintenance, has tried melatonin and ambien, continue ambien as needed     Impaired fasting blood sugar  6/7/10 A1c 6.0%, bs 109  12/5/11 A1c 5.8%, bs 146  9/26/13 A1c 6.1%,bs 140  12/5/14 A1c 5.8%,bs 111  12/5/15 A1c 5.9% done at [facility] benefit plan  10/17/17 A1c 5.5% per [facility] plan  6/7/18 A1c 5.5%  10/22/19 bs 114  12/3/20 A1c 5.6%  2/15/21 A1c 5.8%  7/29/22 A1c 5.6%     History tobacco  Hx of tobacco quit 2010 after smoking 30 yrs     histoyr of shoulder pain  5/29/18 refer to [facility] phone [phone], fax [phone]  6/7/18 right shoulder x-ray arthritis proceed with physical therapy  6/7/18 [facility] PT note     History of prostate cancer  2003 diagnosed with prostate cancer s/p radical prostactomy   4/14/04 psa <0.1  11/5/08 psa <0.1  6/7/10 psa <0.1  12/5/11 psa <0.1  9/26/13 psa <0.01  12/5/14 psa <0.1  12/5/15 psa 0.1 done at [facility] benefit plan  10/17/17 psa<0.1 per [facility] plan  6/7/18 psa<0.01  10/22/19 psa<0.01  12/3/20 psa<0.02  12/21/21 psa<0.02  7/29/22 psa<0.02     Dyslipidemia  10/17/04 chol 230,trig 67,hdl 63,ldl 154  11/5/08 chol 243,trig 69,hdl 63,ldl 166  2010 started on niacin 2000 mg daily  6/7/10 chol 234,trig 114,hdl 63,ldl 148  12/5/11 chol 198,trig 96,hdl 57,ldl 122,crp 0.8  9/26/13 chol 249,trig 143,hdl 53,ldl 167,LDL-P 2088,HDL-P 42,LP-IR 70  12/5/14 chol 214,trig 62,hdl 74,ldl 128,crp 0.8  12/5/15 chol 190,trig 62,hdl 64,ldl 114 done at [facility] benefit plan  10/17/17 chol 250,trig 134,hdl 58,ldl 165 per [facility] plan  6/7/18 chol 218,trig 59,hdl 65,ldl 141  10/22/19 chol 253,trig 93,hdl 72,ldl 162 offered statin  10/29/19 patient declines statin  12/3/20 chol 240,trig 101,hdl 66,ldl 154; 10 year risk 19% declines statin  12/17/20 CT  cardiac calcium score LMA 80.4, LCx 0.0, LAD 80.6, .4 = 302.4  12/17/20 start lipitor 40 mg repeat labs 4 weeks  2/15/21 chol 162,trig 161,hdl 70,ldl 60 on lipitor 40 mg  2/23/21 chol 163,trig 75,hdl 75,ldl 73 on lipitor 40 mg  10/26/21 chol 172,trig 69,hdl 85,ldl 73 on lipitor 40 mg  7/29/22 chol 159,trig 72,hdl 71,ldl 74 on lipitor 40 mg       Patient Active Problem List   Diagnosis    Dyslipidemia    Erectile dysfunction    History of prostate cancer    History of tobacco abuse    S/P appendectomy    Preventative health care    Schatzki's ring    Impaired fasting glucose    History of shoulder pain    Thyroid nodule    Insomnia    Paroxysmal SVT (supraventricular tachycardia) (HCC)    Sleep disorder breathing    Thumb arthritis       ROS:    Ostomy or other tubes or amputations no  Chronic oxygen use no     Gait: normal. Uses assistive device :  no     Depression Screening  Little interest or pleasure in doing things?     Feeling down, depressed , or hopeless?    Trouble falling or staying asleep, or sleeping too much?     Feeling tired or having little energy?     Poor appetite or overeating?     Feeling bad about yourself - or that you are a failure or have let yourself or your family down?    Trouble concentrating on things, such as reading the newspaper or watching television?    Moving or speaking so slowly that other people could have noticed.  Or the opposite - being so fidgety or restless that you have been moving around a lot more than usual?     Thoughts that you would be better off dead, or of hurting yourself?     Patient Health Questionnaire Score:      If depressive symptoms identified deferred to follow up visit unless specifically addressed in assessment and plan.    Interpretation of PHQ-9 Total Score   Score Severity   1-4 No Depression   5-9 Mild Depression   10-14 Moderate Depression   15-19 Moderately Severe Depression   20-27 Severe Depression    Screening for Cognitive  Impairment  Three Minute Recall (daughter, heaven, mountain) 2/3    Wally clock face with all 12 numbers and set the hands to show 10 past 11.  Yes    Cognitive concerns identified deferred for follow up unless specifically addressed in assessment and plan.    Fall Risk Assessment  Has the patient had two or more falls in the last year or any fall with injury in the last year?  No    Safety Assessment  Throw rugs on floor.  No  Handrails on all stairs.  Yes  Good lighting in all hallways.  Yes  Difficulty hearing.  Yes  Patient counseled about all safety risks that were identified.    Functional Assessment ADLs  Are there any barriers preventing you from cooking for yourself or meeting nutritional needs?  No.    Are there any barriers preventing you from driving safely or obtaining transportation?  No.    Are there any barriers preventing you from using a telephone or calling for help?  No    Are there any barriers preventing you from shopping?  No.    Are there any barriers preventing you from taking care of your own finances?  No    Are there any barriers preventing you from managing your medications?  No    Are there any barriers preventing you from showering, bathing or dressing yourself? No    Are you currently engaging in any exercise or physical activity?  No.    What is your perception of your health? Good    Advance Care Planning  Do you have an Advance Directive, Living Will, Durable Power of , or POLST? Yes  Advance Directive Living Will Durable Power of    is not on file - instructed patient to bring in a copy to scan into their chart            Patient Care Team:  Marcell Martinez M.D. as PCP - General (Internal Medicine)      Health Care Screening recommendations reviewed with patient today and updated or ordered.  DPA/Advanced directive: Completed/Information provided.   Referrals for PT/OT/Nutrition counseling/Behavioral Health/Smoking cessation as above if indicated  Discussion today  about general wellness and lifestyle habits:    Prevent falls and reduce trip hazards;   Have a working fire alarm and carbon monoxide detector;   Engage in regular physical activity and social activities;   Use sun protection when outdoors.       ROS           Objective        Physical Exam  Vitals and nursing note reviewed.   Constitutional:       Appearance: Normal appearance.   HENT:      Head: Normocephalic and atraumatic.      Right Ear: External ear normal.      Left Ear: External ear normal.   Eyes:      Conjunctiva/sclera: Conjunctivae normal.   Cardiovascular:      Rate and Rhythm: Normal rate and regular rhythm.      Heart sounds: Normal heart sounds.   Pulmonary:      Effort: Pulmonary effort is normal.      Breath sounds: Normal breath sounds.   Abdominal:      General: There is no distension.   Musculoskeletal:         General: No swelling.   Skin:     Findings: No bruising.   Neurological:      General: No focal deficit present.      Mental Status: He is alert.   Psychiatric:         Mood and Affect: Mood normal.         Behavior: Behavior normal.                           Assessment & Plan        Assessment  #1 medicare wellness    #2 dyslipidemia 7/29/22 chol 159,trig 72,hdl 71,ldl 74      #3 Paroxysmal SVT on metoprolol 25 mg 11/11/22 echo normal LV function, EF 60 to 65%, RVSP 30, mild MR no recurrence    #4 History of prostate cancer status post radical prostatectomy 2003 most recent PSA 7/29/22 psa<0.02    #5 Thyroid nodule most recent imaging 12/8/22 ultrasound thyroid left lower 1.34 x 1.21 x 1.10 cm nodule (previously 1.1 x 1.1 x 1.0 cm), left lower 0.71 x 0.59 x 0.63 cm nodule, no significant change    #6 Elevated blood pressure today but normal readings at home on metoprolol for svt    #7 Prev health  4/19/13 tdap  10/27/15 prevnar  11/13/17 colon per [facility] diverticulosis, otherwise negative repeat 10 years  9/18/18 pneumovax  9/30/19 shingrix second in series   10/22/19 hep c ab  negative  5/16/22 covid booster  7/29/22 vit d 88  7/29/22 psa<0.02  9/20/22 flu    #8 impaired glucose metabolism      Plan  #! Health maintenance reviewed and updated    #2 declines hearing test    #3 fall precautions    #4 cologuard    #5 Advanced directive patient will drop off a copy for us    #6 labs     #7 tdap     #8  Continue metoprolol, home blood pressures have been normal, check blood pressure regularly    #9 follow-up 1 year

## 2023-01-26 ENCOUNTER — TELEPHONE (OUTPATIENT)
Dept: MEDICAL GROUP | Facility: MEDICAL CENTER | Age: 73
End: 2023-01-26
Payer: MEDICARE

## 2023-01-27 ENCOUNTER — TELEPHONE (OUTPATIENT)
Dept: MEDICAL GROUP | Facility: MEDICAL CENTER | Age: 73
End: 2023-01-27
Payer: MEDICARE

## 2023-01-27 NOTE — TELEPHONE ENCOUNTER
----- Message from Marcell Martinez M.D. sent at 1/26/2023  5:17 PM PST -----  Please notify the patient that his Cologuard stool test is negative

## 2023-02-16 ENCOUNTER — APPOINTMENT (OUTPATIENT)
Dept: RADIOLOGY | Facility: MEDICAL CENTER | Age: 73
End: 2023-02-16
Attending: INTERNAL MEDICINE
Payer: MEDICARE

## 2023-02-16 ENCOUNTER — TELEPHONE (OUTPATIENT)
Dept: MEDICAL GROUP | Facility: MEDICAL CENTER | Age: 73
End: 2023-02-16
Payer: MEDICARE

## 2023-02-16 DIAGNOSIS — Z13.6 SCREENING FOR AAA (ABDOMINAL AORTIC ANEURYSM): ICD-10-CM

## 2023-02-16 PROBLEM — Z86.16 HISTORY OF COVID-19: Status: ACTIVE | Noted: 2023-02-16

## 2023-02-16 PROBLEM — I77.819 AORTIC ECTASIA (HCC): Status: ACTIVE | Noted: 2023-02-16

## 2023-02-16 PROCEDURE — 76775 US EXAM ABDO BACK WALL LIM: CPT

## 2023-02-23 ENCOUNTER — HOSPITAL ENCOUNTER (OUTPATIENT)
Dept: LAB | Facility: MEDICAL CENTER | Age: 73
End: 2023-02-23
Attending: INTERNAL MEDICINE
Payer: MEDICARE

## 2023-02-23 DIAGNOSIS — R73.01 IMPAIRED FASTING GLUCOSE: Chronic | ICD-10-CM

## 2023-02-23 DIAGNOSIS — E55.9 VITAMIN D DEFICIENCY: ICD-10-CM

## 2023-02-23 DIAGNOSIS — E78.5 DYSLIPIDEMIA: Chronic | ICD-10-CM

## 2023-02-23 LAB
ALBUMIN SERPL BCP-MCNC: 4.2 G/DL (ref 3.2–4.9)
ALBUMIN/GLOB SERPL: 1.4 G/DL
ALP SERPL-CCNC: 60 U/L (ref 30–99)
ALT SERPL-CCNC: 32 U/L (ref 2–50)
ANION GAP SERPL CALC-SCNC: 10 MMOL/L (ref 7–16)
AST SERPL-CCNC: 42 U/L (ref 12–45)
BASOPHILS # BLD AUTO: 1.1 % (ref 0–1.8)
BASOPHILS # BLD: 0.06 K/UL (ref 0–0.12)
BILIRUB SERPL-MCNC: 0.7 MG/DL (ref 0.1–1.5)
BUN SERPL-MCNC: 6 MG/DL (ref 8–22)
CALCIUM ALBUM COR SERPL-MCNC: 8.9 MG/DL (ref 8.5–10.5)
CALCIUM SERPL-MCNC: 9.1 MG/DL (ref 8.4–10.2)
CHLORIDE SERPL-SCNC: 100 MMOL/L (ref 96–112)
CHOLEST SERPL-MCNC: 182 MG/DL (ref 100–199)
CO2 SERPL-SCNC: 25 MMOL/L (ref 20–33)
CREAT SERPL-MCNC: 0.69 MG/DL (ref 0.5–1.4)
EOSINOPHIL # BLD AUTO: 0.17 K/UL (ref 0–0.51)
EOSINOPHIL NFR BLD: 3.2 % (ref 0–6.9)
ERYTHROCYTE [DISTWIDTH] IN BLOOD BY AUTOMATED COUNT: 46 FL (ref 35.9–50)
EST. AVERAGE GLUCOSE BLD GHB EST-MCNC: 114 MG/DL
FASTING STATUS PATIENT QL REPORTED: NORMAL
GFR SERPLBLD CREATININE-BSD FMLA CKD-EPI: 97 ML/MIN/1.73 M 2
GLOBULIN SER CALC-MCNC: 3.1 G/DL (ref 1.9–3.5)
GLUCOSE SERPL-MCNC: 117 MG/DL (ref 65–99)
HBA1C MFR BLD: 5.6 % (ref 4–5.6)
HCT VFR BLD AUTO: 44.1 % (ref 42–52)
HDLC SERPL-MCNC: 82 MG/DL
HGB BLD-MCNC: 14.6 G/DL (ref 14–18)
IMM GRANULOCYTES # BLD AUTO: 0.02 K/UL (ref 0–0.11)
IMM GRANULOCYTES NFR BLD AUTO: 0.4 % (ref 0–0.9)
LDLC SERPL CALC-MCNC: 87 MG/DL
LYMPHOCYTES # BLD AUTO: 1.63 K/UL (ref 1–4.8)
LYMPHOCYTES NFR BLD: 30.5 % (ref 22–41)
MCH RBC QN AUTO: 32.5 PG (ref 27–33)
MCHC RBC AUTO-ENTMCNC: 33.1 G/DL (ref 33.7–35.3)
MCV RBC AUTO: 98.2 FL (ref 81.4–97.8)
MONOCYTES # BLD AUTO: 0.54 K/UL (ref 0–0.85)
MONOCYTES NFR BLD AUTO: 10.1 % (ref 0–13.4)
NEUTROPHILS # BLD AUTO: 2.92 K/UL (ref 1.82–7.42)
NEUTROPHILS NFR BLD: 54.7 % (ref 44–72)
NRBC # BLD AUTO: 0 K/UL
NRBC BLD-RTO: 0 /100 WBC
PLATELET # BLD AUTO: 251 K/UL (ref 164–446)
PMV BLD AUTO: 9.4 FL (ref 9–12.9)
POTASSIUM SERPL-SCNC: 4.4 MMOL/L (ref 3.6–5.5)
PROT SERPL-MCNC: 7.3 G/DL (ref 6–8.2)
RBC # BLD AUTO: 4.49 M/UL (ref 4.7–6.1)
SODIUM SERPL-SCNC: 135 MMOL/L (ref 135–145)
TRIGL SERPL-MCNC: 66 MG/DL (ref 0–149)
TSH SERPL DL<=0.005 MIU/L-ACNC: 3.81 UIU/ML (ref 0.38–5.33)
WBC # BLD AUTO: 5.3 K/UL (ref 4.8–10.8)

## 2023-02-23 PROCEDURE — 85025 COMPLETE CBC W/AUTO DIFF WBC: CPT

## 2023-02-23 PROCEDURE — 82306 VITAMIN D 25 HYDROXY: CPT

## 2023-02-23 PROCEDURE — 36415 COLL VENOUS BLD VENIPUNCTURE: CPT | Mod: GA

## 2023-02-23 PROCEDURE — 80061 LIPID PANEL: CPT

## 2023-02-23 PROCEDURE — 80053 COMPREHEN METABOLIC PANEL: CPT

## 2023-02-23 PROCEDURE — 84443 ASSAY THYROID STIM HORMONE: CPT

## 2023-02-23 PROCEDURE — 83036 HEMOGLOBIN GLYCOSYLATED A1C: CPT | Mod: GA

## 2023-02-24 ENCOUNTER — TELEPHONE (OUTPATIENT)
Dept: MEDICAL GROUP | Facility: MEDICAL CENTER | Age: 73
End: 2023-02-24
Payer: MEDICARE

## 2023-02-24 LAB — 25(OH)D3 SERPL-MCNC: 56 NG/ML (ref 30–100)

## 2023-02-25 NOTE — TELEPHONE ENCOUNTER
Notified with results, no changes continue atorvastatin.  Fasting blood sugar slightly elevated after his COVID infection but A1c normal.

## 2023-11-29 ENCOUNTER — PATIENT MESSAGE (OUTPATIENT)
Dept: HEALTH INFORMATION MANAGEMENT | Facility: OTHER | Age: 73
End: 2023-11-29

## 2023-12-05 DIAGNOSIS — E78.5 DYSLIPIDEMIA: Chronic | ICD-10-CM

## 2023-12-05 RX ORDER — ATORVASTATIN CALCIUM 40 MG/1
40 TABLET, FILM COATED ORAL DAILY
Qty: 90 TABLET | Refills: 0 | Status: SHIPPED | OUTPATIENT
Start: 2023-12-05

## 2023-12-05 NOTE — TELEPHONE ENCOUNTER
Received request via: Pharmacy    Was the patient seen in the last year in this department? Yes    Does the patient have an active prescription (recently filled or refills available) for medication(s) requested? yes    Does the patient have [facility] and need 100 day supply (blood pressure, diabetes and cholesterol meds only)? Patient does not have SCP   Requested Prescriptions     Pending Prescriptions Disp Refills    atorvastatin (LIPITOR) 40 MG Tab [Pharmacy Med Name: ATORVASTATIN 40MG TABLETS] 90 Tablet 2     Sig: TAKE 1 TABLET BY MOUTH EVERY DAY

## 2023-12-13 RX ORDER — METOPROLOL SUCCINATE 25 MG/1
25 TABLET, EXTENDED RELEASE ORAL DAILY
Qty: 90 TABLET | Refills: 0 | Status: SHIPPED | OUTPATIENT
Start: 2023-12-13 | End: 2024-02-26

## 2023-12-13 NOTE — TELEPHONE ENCOUNTER
Received request via: Pharmacy    Was the patient seen in the last year in this department? Yes    Does the patient have an active prescription (recently filled or refills available) for medication(s) requested? yes    Does the patient have [facility] and need 100 day supply (blood pressure, diabetes and cholesterol meds only)? Patient does not have SCP  Requested Prescriptions     Pending Prescriptions Disp Refills    metoprolol SR (TOPROL XL) 25 MG TABLET SR 24 HR [Pharmacy Med Name: METOPROLOL ER SUCCINATE 25MG TABS] 90 Tablet 0     Sig: TAKE 1 TABLET BY MOUTH EVERY DAY

## 2023-12-21 SDOH — HEALTH STABILITY: PHYSICAL HEALTH: ON AVERAGE, HOW MANY MINUTES DO YOU ENGAGE IN EXERCISE AT THIS LEVEL?: 30 MIN

## 2023-12-21 SDOH — ECONOMIC STABILITY: FOOD INSECURITY: WITHIN THE PAST 12 MONTHS, YOU WORRIED THAT YOUR FOOD WOULD RUN OUT BEFORE YOU GOT MONEY TO BUY MORE.: NEVER TRUE

## 2023-12-21 SDOH — ECONOMIC STABILITY: INCOME INSECURITY: IN THE LAST 12 MONTHS, WAS THERE A TIME WHEN YOU WERE NOT ABLE TO PAY THE MORTGAGE OR RENT ON TIME?: NO

## 2023-12-21 SDOH — ECONOMIC STABILITY: FOOD INSECURITY: WITHIN THE PAST 12 MONTHS, THE FOOD YOU BOUGHT JUST DIDN'T LAST AND YOU DIDN'T HAVE MONEY TO GET MORE.: NEVER TRUE

## 2023-12-21 SDOH — ECONOMIC STABILITY: HOUSING INSECURITY: IN THE LAST 12 MONTHS, HOW MANY PLACES HAVE YOU LIVED?: 1

## 2023-12-21 SDOH — HEALTH STABILITY: PHYSICAL HEALTH: ON AVERAGE, HOW MANY DAYS PER WEEK DO YOU ENGAGE IN MODERATE TO STRENUOUS EXERCISE (LIKE A BRISK WALK)?: 4 DAYS

## 2023-12-21 SDOH — ECONOMIC STABILITY: INCOME INSECURITY: HOW HARD IS IT FOR YOU TO PAY FOR THE VERY BASICS LIKE FOOD, HOUSING, MEDICAL CARE, AND HEATING?: NOT HARD AT ALL

## 2023-12-21 ASSESSMENT — LIFESTYLE VARIABLES
AUDIT-C TOTAL SCORE: 6
HOW OFTEN DO YOU HAVE A DRINK CONTAINING ALCOHOL: 4 OR MORE TIMES A WEEK
SKIP TO QUESTIONS 9-10: 0
HOW OFTEN DO YOU HAVE SIX OR MORE DRINKS ON ONE OCCASION: MONTHLY
HOW MANY STANDARD DRINKS CONTAINING ALCOHOL DO YOU HAVE ON A TYPICAL DAY: 1 OR 2

## 2023-12-21 ASSESSMENT — SOCIAL DETERMINANTS OF HEALTH (SDOH)
HOW OFTEN DO YOU ATTEND CHURCH OR RELIGIOUS SERVICES?: NEVER
HOW OFTEN DO YOU ATTEND CHURCH OR RELIGIOUS SERVICES?: NEVER
HOW OFTEN DO YOU ATTENT MEETINGS OF THE CLUB OR ORGANIZATION YOU BELONG TO?: NEVER
HOW OFTEN DO YOU ATTENT MEETINGS OF THE CLUB OR ORGANIZATION YOU BELONG TO?: NEVER
HOW OFTEN DO YOU GET TOGETHER WITH FRIENDS OR RELATIVES?: TWICE A WEEK
DO YOU BELONG TO ANY CLUBS OR ORGANIZATIONS SUCH AS CHURCH GROUPS UNIONS, FRATERNAL OR ATHLETIC GROUPS, OR SCHOOL GROUPS?: NO
WITHIN THE PAST 12 MONTHS, YOU WORRIED THAT YOUR FOOD WOULD RUN OUT BEFORE YOU GOT THE MONEY TO BUY MORE: NEVER TRUE
HOW OFTEN DO YOU GET TOGETHER WITH FRIENDS OR RELATIVES?: TWICE A WEEK
HOW MANY DRINKS CONTAINING ALCOHOL DO YOU HAVE ON A TYPICAL DAY WHEN YOU ARE DRINKING: 1 OR 2
HOW OFTEN DO YOU HAVE SIX OR MORE DRINKS ON ONE OCCASION: MONTHLY
HOW HARD IS IT FOR YOU TO PAY FOR THE VERY BASICS LIKE FOOD, HOUSING, MEDICAL CARE, AND HEATING?: NOT HARD AT ALL
DO YOU BELONG TO ANY CLUBS OR ORGANIZATIONS SUCH AS CHURCH GROUPS UNIONS, FRATERNAL OR ATHLETIC GROUPS, OR SCHOOL GROUPS?: NO
IN A TYPICAL WEEK, HOW MANY TIMES DO YOU TALK ON THE PHONE WITH FAMILY, FRIENDS, OR NEIGHBORS?: MORE THAN THREE TIMES A WEEK
IN A TYPICAL WEEK, HOW MANY TIMES DO YOU TALK ON THE PHONE WITH FAMILY, FRIENDS, OR NEIGHBORS?: MORE THAN THREE TIMES A WEEK
HOW OFTEN DO YOU HAVE A DRINK CONTAINING ALCOHOL: 4 OR MORE TIMES A WEEK

## 2023-12-22 ENCOUNTER — OFFICE VISIT (OUTPATIENT)
Dept: MEDICAL GROUP | Facility: MEDICAL CENTER | Age: 73
End: 2023-12-22
Payer: MEDICARE

## 2023-12-22 VITALS
DIASTOLIC BLOOD PRESSURE: 80 MMHG | TEMPERATURE: 98.6 F | SYSTOLIC BLOOD PRESSURE: 122 MMHG | WEIGHT: 204 LBS | HEART RATE: 88 BPM | RESPIRATION RATE: 20 BRPM | BODY MASS INDEX: 27.63 KG/M2 | HEIGHT: 72 IN | OXYGEN SATURATION: 99 %

## 2023-12-22 DIAGNOSIS — I47.10 PAROXYSMAL SVT (SUPRAVENTRICULAR TACHYCARDIA) (HCC): ICD-10-CM

## 2023-12-22 DIAGNOSIS — E04.1 THYROID NODULE: ICD-10-CM

## 2023-12-22 DIAGNOSIS — I77.819 AORTIC ECTASIA (HCC): ICD-10-CM

## 2023-12-22 DIAGNOSIS — N52.9 ERECTILE DYSFUNCTION, UNSPECIFIED ERECTILE DYSFUNCTION TYPE: ICD-10-CM

## 2023-12-22 DIAGNOSIS — Z12.5 PROSTATE CANCER SCREENING: ICD-10-CM

## 2023-12-22 DIAGNOSIS — R35.1 NOCTURIA: ICD-10-CM

## 2023-12-22 DIAGNOSIS — Z00.00 MEDICARE ANNUAL WELLNESS VISIT, SUBSEQUENT: ICD-10-CM

## 2023-12-22 DIAGNOSIS — E55.9 VITAMIN D DEFICIENCY: ICD-10-CM

## 2023-12-22 DIAGNOSIS — R73.09 IMPAIRED GLUCOSE METABOLISM: ICD-10-CM

## 2023-12-22 DIAGNOSIS — H53.9 VISION CHANGES: ICD-10-CM

## 2023-12-22 DIAGNOSIS — E78.5 DYSLIPIDEMIA: ICD-10-CM

## 2023-12-22 PROCEDURE — G0439 PPPS, SUBSEQ VISIT: HCPCS | Performed by: INTERNAL MEDICINE

## 2023-12-22 PROCEDURE — 3079F DIAST BP 80-89 MM HG: CPT | Performed by: INTERNAL MEDICINE

## 2023-12-22 PROCEDURE — 3074F SYST BP LT 130 MM HG: CPT | Performed by: INTERNAL MEDICINE

## 2023-12-22 RX ORDER — SILDENAFIL 100 MG/1
100 TABLET, FILM COATED ORAL PRN
Qty: 30 TABLET | Refills: 5 | Status: SHIPPED | OUTPATIENT
Start: 2023-12-22

## 2023-12-22 ASSESSMENT — ACTIVITIES OF DAILY LIVING (ADL): BATHING_REQUIRES_ASSISTANCE: 0

## 2023-12-22 ASSESSMENT — ENCOUNTER SYMPTOMS: GENERAL WELL-BEING: GOOD

## 2023-12-22 ASSESSMENT — FIBROSIS 4 INDEX: FIB4 SCORE: 2.22

## 2023-12-22 ASSESSMENT — PATIENT HEALTH QUESTIONNAIRE - PHQ9: CLINICAL INTERPRETATION OF PHQ2 SCORE: 0

## 2023-12-22 NOTE — PROGRESS NOTES
Hadley Hernández is a 73 y.o. male here for a non-provider visit for      Encounter Vitals  Temperature: 37 °C (98.6 °F)  Temp src: Temporal  Blood Pressure : 122/80  Pulse: 88  Respiration: 20  Pulse Oximetry: 99 %  Weight: 92.5 kg (204 lb)  Height: 182.9 cm (6')  BMI (Calculated): 27.67    If abnormal, was the Registered Nurse (office provider if RN is unavailable) notified today? Not Indicated    Routed to PCP/Requested Provider? No

## 2023-12-22 NOTE — PROGRESS NOTES
Chief Complaint   Patient presents with    Annual Exam       HPI:  Roland Hernández is a 73 y.o. here for Medicare Annual Wellness Visit       Medicare wellness  Current supplements: Reviewed  Chronic narcotic pain medicines: No  Allergies:  reviewed  Screening: Reviewed  Depressive Symptoms: Denies feeling down, depressed or hopeless. Denies loss of interest or pleasure in doing things   ADLs: Denies needing help with using telephone, transportation, shopping, preparing meals, housework, laundry, or managing medication or money.    Independent with bathing, hygiene, feeding, toileting, dressing    Memory concerns: Denies difficulty remembering details of conversations, events and upcoming appointments.  Hearing problems: Denies.   Recent falls: has fallen getting tangled in dog's leash   Eating healthy diet, avoids eating out, tries to limit sweets and carb portion sizes, drinks water, one cup of coffee in am, no soda. Etoh per week 10-14 per week   Keeps active walking 3-4 per week for exercise, does weight training, otherwise no difficulty with falls or balance, goes up and down the steps at home without difficulty  Current social contact/activities: Reviewed  Blood pressure at home 117/72 on metoprolol  No recent eye exam   Teeth cleaning 4 times dr.logan motley   ROS:    Ostomy or other tubes or amputations no  Chronic oxygen use no  No chest pain, palpitations, lightheadedness, syncope  Gait: normal. Uses assistive device :  no  Occasional nocturia  No joint pain  Occasion has difficulty seeing small print on television, no night driving issues    Current Outpatient Medications   Medication Sig Dispense Refill    metoprolol SR (TOPROL XL) 25 MG TABLET SR 24 HR TAKE 1 TABLET BY MOUTH EVERY DAY 90 Tablet 0    atorvastatin (LIPITOR) 40 MG Tab TAKE 1 TABLET BY MOUTH EVERY DAY 90 Tablet 0    sildenafil citrate (VIAGRA) 100 MG tablet Take 1 Tablet by mouth as needed for Erectile Dysfunction. One per day 30  Tablet 5    zolpidem (AMBIEN) 10 MG Tab        No current facility-administered medications for this visit.            thyroid nodule  2/10/20 ultrasound thyroid left nodule 1.1 x 0.9 x 1.0 cm TIRADS 6, repeat 1 year ultrasound  12/3/20 tsh 2.8  2/23/20 ultrasound thyroid 1.2 x 1.1 x 1.0 cm (previously 1.1 x 0.9 x 1.0 cm) left lower pole thyroid isoechoic solid mass TIRADS 3, mildly suspicious has enlarged slightly, recommend repeat ultrasound 1 year  12/10/21 ultrasound thyroid left lower nodule 1.0 x 1.1 x 1.1 cm (previously 1.2 x 1.1 x 1.0 cm)  12/21/21 tsh 4.3  7/29/22 tsh 3.2  12/8/22 ultrasound thyroid left lower 1.34 x 1.21 x 1.10 cm nodule (previously 1.1 x 1.1 x 1.0 cm), left lower 0.71 x 0.59 x 0.63 cm nodule, no significant change     thumb arthritis  12/8/22 x-ray right thumb, no evidence of fracture or dislocation, osteoarthritis first carpometacarpal articulation with small osteophyte     sleep disordered breathing  8/27/22 OPO by [facility] average 90% time less than 88% jose 69% declines sleep apnea evaluation      psvt  7/25/22 symptoms of body tingling and nausea first awakening in the morning, does not seem to have the rest of the day, also has had at least 1 episode of some lightheadedness when going from sitting to standing, EKG ordered, echo, event monitor x2 weeks, labs, overnight pulse oximetry for stopping breathing at night per wife to [facility]  8/1/22 to 8/15/22 zio monitor average heart rate 83, maximum 190, minimum 53, no atrial fibrillation, 9 episodes of SVT fastest 20 beats maximum rate 190 longest lasting 15 seconds with average rate of 174, no heart block, no pauses, no ventricular tachycardia, less than 1% PAC and PVC burden  8/21/22 patient declines beta blocker for now since no palpitations  8/27/22 OPO by [facility] average 90% time less than 88% jose 69% declines sleep apnea evaluation   8/29/22 start metoprolol 25 mg   11/11/22 echo normal LV function, EF 60 to 65%, RVSP 30, mild  MR  12/19/22 on metoprolol 25 mg     Schatzki's ring  11/14/12 EGD per [facility], schatzki's ring, gastritis, 57 french dilation     sleep disordered breathing  8/27/22 OPO by [facility] average 90% time less than 88% jose 69% declines sleep apnea evaluation      Status post appendectomy     prev health  10/27/15 prevnar  11/13/17 colon per [facility] diverticulosis, otherwise negative repeat 10 years  9/18/18 pneumovax  9/30/19 shingrix second in series   10/22/19 hep c ab negative  7/29/22 psa<0.02  12/19/22 tdap  1/22/23 cologuard negative  2/24/23 vit d 56  10/3/23 flu  10/12/23 rsv  10/23/23 covid     insomnia  11/19/20 difficulty with sleep maintenance, has tried melatonin and ambien, continue ambien as needed     Impaired fasting blood sugar  6/7/10 A1c 6.0%, bs 109  12/5/11 A1c 5.8%, bs 146  9/26/13 A1c 6.1%,bs 140  12/5/14 A1c 5.8%,bs 111  12/5/15 A1c 5.9% done at [facility] benefit plan  10/17/17 A1c 5.5% per [facility] plan  6/7/18 A1c 5.5%  10/22/19 bs 114  12/3/20 A1c 5.6%  2/15/21 A1c 5.8%  7/29/22 A1c 5.6%  2/24/23 A1c 5.6%     History tobacco  Hx of tobacco quit 2010 after smoking 30 yrs     histoyr of shoulder pain  5/29/18 refer to [facility] phone [phone], fax [phone]  6/7/18 right shoulder x-ray arthritis proceed with physical therapy  6/7/18 [facility] PT note     History of prostate cancer  2003 diagnosed with prostate cancer s/p radical prostactomy   4/14/04 psa <0.1  11/5/08 psa <0.1  6/7/10 psa <0.1  12/5/11 psa <0.1  9/26/13 psa <0.01  12/5/14 psa <0.1  12/5/15 psa 0.1 done at [facility] benefit plan  10/17/17 psa<0.1 per [facility] plan  6/7/18 psa<0.01  10/22/19 psa<0.01  12/3/20 psa<0.02  12/21/21 psa<0.02  7/29/22 psa<0.02     Dyslipidemia  10/17/04 chol 230,trig 67,hdl 63,ldl 154  11/5/08 chol 243,trig 69,hdl 63,ldl 166  2010 started on niacin 2000 mg daily  6/7/10 chol 234,trig 114,hdl 63,ldl 148  12/5/11 chol 198,trig 96,hdl 57,ldl 122,crp 0.8  9/26/13 chol 249,trig 143,hdl 53,ldl 167,LDL-P 2088,HDL-P  42,LP-IR 70  12/5/14 chol 214,trig 62,hdl 74,ldl 128,crp 0.8  12/5/15 chol 190,trig 62,hdl 64,ldl 114 done at [facility] benefit plan  10/17/17 chol 250,trig 134,hdl 58,ldl 165 per [facility] plan  6/7/18 chol 218,trig 59,hdl 65,ldl 141  10/22/19 chol 253,trig 93,hdl 72,ldl 162 offered statin  10/29/19 patient declines statin  12/3/20 chol 240,trig 101,hdl 66,ldl 154; 10 year risk 19% declines statin  12/17/20 CT cardiac calcium score LMA 80.4, LCx 0.0, LAD 80.6, .4 = 302.4  12/17/20 start lipitor 40 mg repeat labs 4 weeks  2/15/21 chol 162,trig 161,hdl 70,ldl 60 on lipitor 40 mg  2/23/21 chol 163,trig 75,hdl 75,ldl 73 on lipitor 40 mg  10/26/21 chol 172,trig 69,hdl 85,ldl 73 on lipitor 40 mg  7/29/22 chol 159,trig 72,hdl 71,ldl 74 on lipitor 40 mg  2/24/23 chol 182,trig 66,hdl 82,ldl 87 on lipitor 40 mg    aortic ectasia  2/16/23 ultrasound aorta minimal ectasia no discrete aneurysm          Patient Active Problem List   Diagnosis    Dyslipidemia    Erectile dysfunction    History of prostate cancer    History of tobacco abuse    S/P appendectomy    Preventative health care    Schatzki's ring    Impaired fasting glucose    History of shoulder pain    Thyroid nodule    Insomnia    Paroxysmal SVT (supraventricular tachycardia)    Sleep disorder breathing    Thumb arthritis    Aortic ectasia (HCC)    History of COVID-19         Health Care Screening recommendations reviewed with patient today and updated or ordered.  DPA/Advanced directive: Completed/Information provided.   Referrals for PT/OT/Nutrition counseling/Behavioral Health/Smoking cessation as above if indicated  Discussion today about general wellness and lifestyle habits:    Prevent falls and reduce trip hazards;   Have a working fire alarm and carbon monoxide detector;   Engage in regular physical activity and social activities;   Use sun protection when outdoors.       Patient Active Problem List    Diagnosis Date Noted    Aortic ectasia (HCC) 02/16/2023     History of COVID-19 02/16/2023    Thumb arthritis 12/08/2022    Sleep disorder breathing 09/08/2022    Paroxysmal SVT (supraventricular tachycardia) 08/19/2022    Insomnia 11/19/2020    Thyroid nodule 02/10/2020    History of shoulder pain 05/29/2018    Impaired fasting glucose 09/14/2013    S/P appendectomy 09/12/2013    Preventative health care 09/12/2013    Schatzki's ring 09/12/2013    Dyslipidemia 04/09/2013    Erectile dysfunction 04/09/2013    History of prostate cancer 04/09/2013    History of tobacco abuse 04/09/2013       Current Outpatient Medications   Medication Sig Dispense Refill    metoprolol SR (TOPROL XL) 25 MG TABLET SR 24 HR TAKE 1 TABLET BY MOUTH EVERY DAY 90 Tablet 0    atorvastatin (LIPITOR) 40 MG Tab TAKE 1 TABLET BY MOUTH EVERY DAY 90 Tablet 0    sildenafil citrate (VIAGRA) 100 MG tablet Take 1 Tablet by mouth as needed for Erectile Dysfunction. One per day 30 Tablet 5    zolpidem (AMBIEN) 10 MG Tab        No current facility-administered medications for this visit.          Current supplements as per medication list.     Allergies: Nkda [no known drug allergy]    Current social contact/activities: walk    He  reports that he has quit smoking. He has never used smokeless tobacco. He reports current alcohol use of about 8.4 - 12.6 oz of alcohol per week. He reports that he does not use drugs.  Counseling given: Not Answered  Tobacco comments: 1 ppd 25 yrs,quit 2011      ROS:    Gait: Uses no assistive device  Ostomy: No  Other tubes: No  Amputations: Yes  Chronic oxygen use: No  Last eye exam: last year  Wears hearing aids: No   : Denies any urinary leakage during the last 6 months       Depression Screening  Little interest or pleasure in doing things?  0 - not at all  Feeling down, depressed , or hopeless? 0 - not at all  Patient Health Questionnaire Score: 0     If depressive symptoms identified deferred to follow up visit unless specifically addressed in assessment and  plan.    Interpretation of PHQ-9 Total Score   Score Severity   1-4 No Depression   5-9 Mild Depression   10-14 Moderate Depression   15-19 Moderately Severe Depression   20-27 Severe Depression    Screening for Cognitive Impairment  Do you or any of your friends or family members have any concern about your memory? No  Three Minute Recall (Banana, Sunrise, Chair) 3/3    Wally clock face with all 12 numbers and set the hands to show 20 past 8.  Yes    Cognitive concerns identified deferred for follow up unless specifically addressed in assessment and plan.    Fall Risk Assessment  Has the patient had two or more falls in the last year or any fall with injury in the last year?  Yes    Safety Assessment  Do you always wear your seatbelt?  Yes  Any changes to home needed to function safely? No  Difficulty hearing.  No  Patient counseled about all safety risks that were identified.    Functional Assessment ADLs  Are there any barriers preventing you from cooking for yourself or meeting nutritional needs?  No.    Are there any barriers preventing you from driving safely or obtaining transportation?  No.    Are there any barriers preventing you from using a telephone or calling for help?  No    Are there any barriers preventing you from shopping?  No.    Are there any barriers preventing you from taking care of your own finances?  No    Are there any barriers preventing you from managing your medications?  No    Are there any barriers preventing you from showering, bathing or dressing yourself? No    Are there any barriers preventing you from doing housework or laundry? No  Are there any barriers preventing you from using the toilet?No  Are you currently engaging in any exercise or physical activity?  Yes.      Self-Assessment of Health  What is your perception of your health? Good  Do you sleep more than six hours a night?    In the past 7 days, how much did pain keep you from doing your normal work? Some  Do you spend  quality time with family or friends (virtually or in person)?    Do you usually eat a heart healthy diet that constists of a variety of fruits, vegetables, whole grains and fiber? No  Do you eat foods high in fat and/or Fast Food more than three times per week? No    Advance Care Planning  Do you have an Advance Directive, Living Will, Durable Power of , or POLST? Yes                 Health Maintenance Summary            Overdue - Annual Wellness Visit (Every 366 Days) Overdue since 12/20/2023 12/19/2022  Visit Dx: Medicare annual wellness visit, subsequent    11/18/2021  Done    11/19/2020  Visit Dx: Medicare annual wellness visit, subsequent    09/30/2019  Visit Dx: Medicare annual wellness visit, subsequent    09/18/2018  Visit Dx: Medicare annual wellness visit, subsequent    Only the first 5 history entries have been loaded, but more history exists.              Postponed - COVID-19 Vaccine (5 - 2023-24 season) Postponed until 12/22/2024      10/23/2023  Imm Admin: Covid-19 Mrna (Spikevax) Moderna 12+ Years    10/11/2022  Imm Admin: MODERNA BIVALENT BOOSTER SARS-COV-2 VACCINE (6+)    05/16/2022  Imm Admin: MODERNA SARS-COV-2 VACCINE (12+)    03/05/2021  Imm Admin: MODERNA SARS-COV-2 VACCINE (12+)    02/06/2021  Imm Admin: MODERNA SARS-COV-2 VACCINE (12+)              Colorectal Cancer Screening (Colonoscopy - Every 10 Years) Tentatively due on 11/13/2027 01/26/2023  COLOGUARD COLON CANCER SCREENING    11/13/2017  REFERRAL TO GI FOR COLONOSCOPY    11/14/2012  AMB REFERRAL TO GI FOR COLONOSCOPY              IMM DTaP/Tdap/Td Vaccine (4 - Td or Tdap) Next due on 12/19/2032 12/19/2022  Imm Admin: Tdap Vaccine    10/27/2015  Imm Admin: Tdap Vaccine    04/09/2013  Imm Admin: Tdap Vaccine              Pneumococcal Vaccine: 65+ Years (Series Information) Completed      09/18/2018  Imm Admin: Pneumococcal polysaccharide vaccine (PPSV-23)    10/27/2015  Imm Admin: Pneumococcal Conjugate Vaccine  (Prevnar/PCV-13)    04/09/2013  Imm Admin: Pneumococcal polysaccharide vaccine (PPSV-23)              Zoster (Shingles) Vaccines (Series Information) Completed      09/30/2019  Imm Admin: Zoster Vaccine Recombinant (RZV) (SHINGRIX)    12/13/2018  Imm Admin: Zoster Vaccine Recombinant (RZV) (SHINGRIX)    09/12/2013  Imm Admin: Zoster Vaccine Live (ZVL) (Zostavax) - HISTORICAL DATA              Influenza Vaccine (Series Information) Completed      10/03/2023  Imm Admin: Influenza Vaccine Adult HD    09/20/2022  Imm Admin: Influenza Vaccine Adult HD    11/01/2021  Imm Admin: Influenza Vaccine Adult HD    09/14/2020  Imm Admin: Influenza, Unspecified - HISTORICAL DATA    09/30/2019  Imm Admin: Influenza Vaccine Adult HD    Only the first 5 history entries have been loaded, but more history exists.              Abdominal Aortic Aneurysm (AAA) Screening  Completed      12/22/2023  Done    02/16/2023  US-ABDOMINAL AORTA W/O DOPPLER              Hepatitis C Screening  Completed      12/22/2023  Done    10/22/2019  Hepatitis C Antibody component of HEP C VIRUS ANTIBODY              Hepatitis A Vaccine (Hep A) (Series Information) Aged Out      No completion history exists for this topic.              HPV Vaccines (Series Information) Aged Out      No completion history exists for this topic.              Polio Vaccine (Inactivated Polio) (Series Information) Aged Out      No completion history exists for this topic.              Meningococcal Immunization (Series Information) Aged Out      No completion history exists for this topic.              Discontinued - Hepatitis B Vaccine (Hep B)  Discontinued      No completion history exists for this topic.                    Patient Care Team:  Marcell Martinez M.D. as PCP - General (Internal Medicine)        Social History     Tobacco Use    Smoking status: Former     Current packs/day: 1.50     Types: Cigarettes    Smokeless tobacco: Never    Tobacco comments:     1 ppd 23  yrs,quit 2011   Vaping Use    Vaping Use: Never used   Substance Use Topics    Alcohol use: Yes     Alcohol/week: 8.4 - 12.6 oz     Types: 14 - 21 Glasses of wine per week     Comment: wine 2-3 drinks per day     Drug use: No     Family History   Problem Relation Age of Onset    Dementia Mother     Cancer Father         bladder cancer    Cancer Sister          lymphatic     He  has a past medical history of Cancer (HCC) (2003), Dental disorder, and ED (erectile dysfunction).   Past Surgical History:   Procedure Laterality Date    MASS EXCISION ORTHO  3/22/2010    Performed by TODD ORDONEZ at SURGERY SAME DAY [facility] ORS    PROSTATECTOMY ROBOTIC  2003    APPENDECTOMY      TONSILLECTOMY         Exam:              Head atraumatic  Hearing good.  TMs clear  Dentition good oropharynx noninjected  Neck no adenopathy or thyromegaly  Alert, oriented in no acute distress.  Eye contact is good, speech goal directed, affect calm  Lungs clear  Cardiovascular S1-S2 regular  Extremities no edema      Assessment and Plan. The following treatment and monitoring plan is recommended:     Assessment  #1 Medicare wellness    #2 thyroid nodule 12/8/22 ultrasound thyroid left lower 1.34 x 1.21 x 1.10 cm nodule (previously 1.1 x 1.1 x 1.0 cm), left lower 0.71 x 0.59 x 0.63 cm nodule, no significant change    #3 paroxysmal SVT no recurrence on metoprolol    #4 dyslipidemia on Lipitor 2/24/23 chol 182,trig 66,hdl 82,ldl 87 on lipitor 40 mg    #5 history of prostate cancer 2003 status post prostatectomy, last PSA a year ago undetectable    #6 impaired glucose metabolism last A1c in February 5 0.6%    #7 occasional nocturia    #8 occasional left difficulty seeing small print    #9 aortic ectasia by ultrasound in February no aneurysm    Plan  #1 health maintenance reviewed and updated    #2 ultrasound thyroid follow-up nodule    #3 Labs    #4 continue metoprolol, check blood pressure regularly    #5 continue good nutrition and  exercise program    #6 continue Lipitor    #7 referral ophthalmology  whom his wife sees    #8 continue regular exercise, falling precautions, continue good nutrition program limiting sweets, candies, processed foods    #9 patient will drop off copy of advance directives    #10 up-to-date on vaccines    #11 follow-up 1 year        Services suggested: No services needed at this time  Health Care Screening: Age-appropriate preventive services recommended by USPTF and ACIP covered by Medicare were discussed today. Services ordered if indicated and agreed upon by the patient.  Referrals offered: Community-based lifestyle interventions to reduce health risks and promote self-management and wellness, fall prevention, nutrition, physical activity, tobacco-use cessation, weight loss, and mental health services as per orders if indicated.    Discussion today about general wellness and lifestyle habits:    Prevent falls and reduce trip hazards; Cautioned about securing or removing rugs.  Have a working fire alarm and carbon monoxide detector;   Engage in regular physical activity and social activities     Follow-up:

## 2024-01-08 ENCOUNTER — HOSPITAL ENCOUNTER (OUTPATIENT)
Dept: LAB | Facility: MEDICAL CENTER | Age: 74
End: 2024-01-08
Attending: INTERNAL MEDICINE
Payer: MEDICARE

## 2024-01-08 DIAGNOSIS — Z12.5 PROSTATE CANCER SCREENING: ICD-10-CM

## 2024-01-08 DIAGNOSIS — R73.09 IMPAIRED GLUCOSE METABOLISM: ICD-10-CM

## 2024-01-08 DIAGNOSIS — R35.1 NOCTURIA: ICD-10-CM

## 2024-01-08 DIAGNOSIS — E78.5 DYSLIPIDEMIA: ICD-10-CM

## 2024-01-08 DIAGNOSIS — E55.9 VITAMIN D DEFICIENCY: ICD-10-CM

## 2024-01-08 LAB
25(OH)D3 SERPL-MCNC: 47 NG/ML (ref 30–100)
ALBUMIN SERPL BCP-MCNC: 4.4 G/DL (ref 3.2–4.9)
ALBUMIN/GLOB SERPL: 1.5 G/DL
ALP SERPL-CCNC: 57 U/L (ref 30–99)
ALT SERPL-CCNC: 35 U/L (ref 2–50)
ANION GAP SERPL CALC-SCNC: 12 MMOL/L (ref 7–16)
APPEARANCE UR: CLEAR
AST SERPL-CCNC: 53 U/L (ref 12–45)
BASOPHILS # BLD AUTO: 1 % (ref 0–1.8)
BASOPHILS # BLD: 0.06 K/UL (ref 0–0.12)
BILIRUB SERPL-MCNC: 0.7 MG/DL (ref 0.1–1.5)
BILIRUB UR QL STRIP.AUTO: NEGATIVE
BUN SERPL-MCNC: 8 MG/DL (ref 8–22)
CALCIUM ALBUM COR SERPL-MCNC: 9.2 MG/DL (ref 8.5–10.5)
CALCIUM SERPL-MCNC: 9.5 MG/DL (ref 8.5–10.5)
CHLORIDE SERPL-SCNC: 100 MMOL/L (ref 96–112)
CHOLEST SERPL-MCNC: 175 MG/DL (ref 100–199)
CO2 SERPL-SCNC: 25 MMOL/L (ref 20–33)
COLOR UR: YELLOW
CREAT SERPL-MCNC: 0.71 MG/DL (ref 0.5–1.4)
EOSINOPHIL # BLD AUTO: 0.18 K/UL (ref 0–0.51)
EOSINOPHIL NFR BLD: 2.9 % (ref 0–6.9)
ERYTHROCYTE [DISTWIDTH] IN BLOOD BY AUTOMATED COUNT: 47.8 FL (ref 35.9–50)
EST. AVERAGE GLUCOSE BLD GHB EST-MCNC: 111 MG/DL
FASTING STATUS PATIENT QL REPORTED: NORMAL
GFR SERPLBLD CREATININE-BSD FMLA CKD-EPI: 96 ML/MIN/1.73 M 2
GLOBULIN SER CALC-MCNC: 2.9 G/DL (ref 1.9–3.5)
GLUCOSE SERPL-MCNC: 123 MG/DL (ref 65–99)
GLUCOSE UR STRIP.AUTO-MCNC: NEGATIVE MG/DL
HBA1C MFR BLD: 5.5 % (ref 4–5.6)
HCT VFR BLD AUTO: 47 % (ref 42–52)
HDLC SERPL-MCNC: 79 MG/DL
HGB BLD-MCNC: 15.6 G/DL (ref 14–18)
IMM GRANULOCYTES # BLD AUTO: 0.01 K/UL (ref 0–0.11)
IMM GRANULOCYTES NFR BLD AUTO: 0.2 % (ref 0–0.9)
KETONES UR STRIP.AUTO-MCNC: ABNORMAL MG/DL
LDLC SERPL CALC-MCNC: 81 MG/DL
LEUKOCYTE ESTERASE UR QL STRIP.AUTO: NEGATIVE
LYMPHOCYTES # BLD AUTO: 2 K/UL (ref 1–4.8)
LYMPHOCYTES NFR BLD: 32.1 % (ref 22–41)
MCH RBC QN AUTO: 34.5 PG (ref 27–33)
MCHC RBC AUTO-ENTMCNC: 33.2 G/DL (ref 32.3–36.5)
MCV RBC AUTO: 104 FL (ref 81.4–97.8)
MICRO URNS: ABNORMAL
MONOCYTES # BLD AUTO: 0.7 K/UL (ref 0–0.85)
MONOCYTES NFR BLD AUTO: 11.2 % (ref 0–13.4)
NEUTROPHILS # BLD AUTO: 3.28 K/UL (ref 1.82–7.42)
NEUTROPHILS NFR BLD: 52.6 % (ref 44–72)
NITRITE UR QL STRIP.AUTO: NEGATIVE
NRBC # BLD AUTO: 0 K/UL
NRBC BLD-RTO: 0 /100 WBC (ref 0–0.2)
PH UR STRIP.AUTO: 5 [PH] (ref 5–8)
PLATELET # BLD AUTO: 274 K/UL (ref 164–446)
PMV BLD AUTO: 9.8 FL (ref 9–12.9)
POTASSIUM SERPL-SCNC: 5.5 MMOL/L (ref 3.6–5.5)
PROT SERPL-MCNC: 7.3 G/DL (ref 6–8.2)
PROT UR QL STRIP: NEGATIVE MG/DL
PSA SERPL-MCNC: <0.02 NG/ML (ref 0–4)
RBC # BLD AUTO: 4.52 M/UL (ref 4.7–6.1)
RBC UR QL AUTO: NEGATIVE
SODIUM SERPL-SCNC: 137 MMOL/L (ref 135–145)
SP GR UR STRIP.AUTO: 1.02
TRIGL SERPL-MCNC: 76 MG/DL (ref 0–149)
TSH SERPL DL<=0.005 MIU/L-ACNC: 2.9 UIU/ML (ref 0.38–5.33)
UROBILINOGEN UR STRIP.AUTO-MCNC: 0.2 MG/DL
WBC # BLD AUTO: 6.2 K/UL (ref 4.8–10.8)

## 2024-01-08 PROCEDURE — 85025 COMPLETE CBC W/AUTO DIFF WBC: CPT

## 2024-01-08 PROCEDURE — 84153 ASSAY OF PSA TOTAL: CPT | Mod: GA

## 2024-01-08 PROCEDURE — 36415 COLL VENOUS BLD VENIPUNCTURE: CPT | Mod: GA

## 2024-01-08 PROCEDURE — 80061 LIPID PANEL: CPT

## 2024-01-08 PROCEDURE — 84443 ASSAY THYROID STIM HORMONE: CPT

## 2024-01-08 PROCEDURE — 81003 URINALYSIS AUTO W/O SCOPE: CPT

## 2024-01-08 PROCEDURE — 83036 HEMOGLOBIN GLYCOSYLATED A1C: CPT | Mod: GA

## 2024-01-08 PROCEDURE — 82306 VITAMIN D 25 HYDROXY: CPT

## 2024-01-08 PROCEDURE — 80053 COMPREHEN METABOLIC PANEL: CPT

## 2024-01-10 ENCOUNTER — TELEPHONE (OUTPATIENT)
Dept: MEDICAL GROUP | Facility: MEDICAL CENTER | Age: 74
End: 2024-01-10
Payer: MEDICARE

## 2024-01-10 NOTE — TELEPHONE ENCOUNTER
Called the patient left a message, please notify him that his test shows:  (1) his cholesterol total is 175, good cholesterol is 79 (goal is above 40), bad cholesterol is 81 (goal is less than 100), have him continue his atorvastatin cholesterol medication  (2) his prostate cancer blood test is still undetectable  (3) his thyroid and vitamin D levels are normal  (4) his blood sugar is normal, he does not have diabetes, his kidney function test is normal  (5) one liver function test is normal, the other liver test is just minimally elevated by a few points, sometimes alcohol can mildly irritate liver, have him continue to minimize alcohol intake  (6) have him continue a good nutrition exercise program

## 2024-01-11 ENCOUNTER — TELEPHONE (OUTPATIENT)
Dept: MEDICAL GROUP | Facility: MEDICAL CENTER | Age: 74
End: 2024-01-11
Payer: MEDICARE

## 2024-01-11 NOTE — TELEPHONE ENCOUNTER
----- Message from Marcell Martinez M.D. sent at 1/10/2024  4:00 AM PST -----  Called the patient left a message, please notify him that his test shows:  (1) his cholesterol total is 175, good cholesterol is 79 (goal is above 40), bad cholesterol is 81 (goal is less than 100), have him continue his atorvastatin cholesterol medication  (2) his prostate cancer blood test is still undetectable  (3) his thyroid and vitamin D levels are normal  (4) his blood sugar is normal, he does not have diabetes, his kidney function test is normal  (5) one liver function test is normal, the other liver test is just minimally elevated by a few points, sometimes alcohol can mildly irritate liver, have him continue to minimize alcohol intake  (6) have him continue a good nutrition exercise program

## 2024-01-29 ENCOUNTER — HOSPITAL ENCOUNTER (OUTPATIENT)
Dept: RADIOLOGY | Facility: MEDICAL CENTER | Age: 74
End: 2024-01-29
Attending: INTERNAL MEDICINE
Payer: MEDICARE

## 2024-01-29 DIAGNOSIS — E04.1 THYROID NODULE: ICD-10-CM

## 2024-01-29 PROCEDURE — 76536 US EXAM OF HEAD AND NECK: CPT

## 2024-01-30 ENCOUNTER — TELEPHONE (OUTPATIENT)
Dept: MEDICAL GROUP | Facility: MEDICAL CENTER | Age: 74
End: 2024-01-30
Payer: MEDICARE

## 2024-01-30 NOTE — TELEPHONE ENCOUNTER
----- Message from Marcell Martinez M.D. sent at 1/30/2024 12:00 AM PST -----  Called the patient left a message, please notify him that his thyroid ultrasound shows no change in the size of his thyroid nodule, I would recommend repeating the thyroid ultrasound in 1 year

## 2024-01-30 NOTE — TELEPHONE ENCOUNTER
Called the patient left a message, please notify him that his thyroid ultrasound shows no change in the size of his thyroid nodule, I would recommend repeating the thyroid ultrasound in 1 year

## 2024-02-26 RX ORDER — METOPROLOL SUCCINATE 25 MG/1
25 TABLET, EXTENDED RELEASE ORAL DAILY
Qty: 90 TABLET | Refills: 3 | Status: SHIPPED | OUTPATIENT
Start: 2024-02-26

## 2024-07-31 ENCOUNTER — APPOINTMENT (OUTPATIENT)
Dept: RADIOLOGY | Facility: MEDICAL CENTER | Age: 74
End: 2024-07-31
Attending: STUDENT IN AN ORGANIZED HEALTH CARE EDUCATION/TRAINING PROGRAM
Payer: MEDICARE

## 2024-07-31 ENCOUNTER — HOSPITAL ENCOUNTER (EMERGENCY)
Facility: MEDICAL CENTER | Age: 74
End: 2024-07-31
Attending: STUDENT IN AN ORGANIZED HEALTH CARE EDUCATION/TRAINING PROGRAM
Payer: MEDICARE

## 2024-07-31 VITALS
WEIGHT: 204 LBS | BODY MASS INDEX: 27.67 KG/M2 | HEART RATE: 81 BPM | RESPIRATION RATE: 18 BRPM | SYSTOLIC BLOOD PRESSURE: 130 MMHG | DIASTOLIC BLOOD PRESSURE: 71 MMHG | OXYGEN SATURATION: 92 % | TEMPERATURE: 97.5 F

## 2024-07-31 DIAGNOSIS — S01.81XA FACIAL LACERATION, INITIAL ENCOUNTER: ICD-10-CM
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
## 2024-07-31 PROCEDURE — 72125 CT NECK SPINE W/O DYE: CPT

## 2024-07-31 PROCEDURE — 304999 HCHG REPAIR-SIMPLE/INTERMED LEVEL 1

## 2024-07-31 PROCEDURE — 70486 CT MAXILLOFACIAL W/O DYE: CPT

## 2024-07-31 PROCEDURE — 304217 HCHG IRRIGATION SYSTEM

## 2024-07-31 PROCEDURE — 700101 HCHG RX REV CODE 250: Performed by: STUDENT IN AN ORGANIZED HEALTH CARE EDUCATION/TRAINING PROGRAM

## 2024-07-31 PROCEDURE — 303747 HCHG EXTRA SUTURE

## 2024-07-31 PROCEDURE — 99285 EMERGENCY DEPT VISIT HI MDM: CPT

## 2024-07-31 PROCEDURE — 70450 CT HEAD/BRAIN W/O DYE: CPT

## 2024-07-31 RX ORDER — LIDOCAINE HYDROCHLORIDE AND EPINEPHRINE 10; 10 MG/ML; UG/ML
10 INJECTION, SOLUTION INFILTRATION; PERINEURAL ONCE
Status: COMPLETED | OUTPATIENT
Start: 2024-07-31 | End: 2024-07-31

## 2024-07-31 RX ADMIN — LIDOCAINE HYDROCHLORIDE AND EPINEPHRINE 10 ML: 10; 10 INJECTION, SOLUTION INFILTRATION; PERINEURAL at 19:00

## 2024-07-31 ASSESSMENT — FIBROSIS 4 INDEX: FIB4 SCORE: 2.45

## 2024-08-01 NOTE — ED TRIAGE NOTES
Pt is [facility] following a GLF prior to arrival. Per the pt he tripped and fell in his bedroom. He states his his jaw on the footboard of his bed. He endorses drinking a significant amount of alcohol today

## 2024-08-01 NOTE — ED PROVIDER NOTES
ED Provider Note    CHIEF COMPLAINT  Chief Complaint   Patient presents with    Fall    Facial Injury    Facial Laceration       EXTERNAL RECORDS REVIEWED      HPI/ROS  LIMITATION TO HISTORY   Select: : None  OUTSIDE HISTORIAN(S):      Roland Hernández is a 73 y.o. male who presents with laceration overlying the right jaw after a mechanical ground-level fall that occurred at home just prior to arrival.  Patient does endorse drinking some mimosas earlier today perhaps contributing to his poor balance.  He denies any pain at this time.  He denies LOC denies blood thinner use    PAST MEDICAL HISTORY   has a past medical history of Cancer (HCC) (2003), Dental disorder, and ED (erectile dysfunction).    SURGICAL HISTORY   has a past surgical history that includes prostatectomy robotic (2003); appendectomy; tonsillectomy; and mass excision ortho (3/22/2010).    FAMILY HISTORY  Family History   Problem Relation Age of Onset    Dementia Mother     Cancer Father         bladder cancer    Cancer Sister          lymphatic       SOCIAL HISTORY  Social History     Tobacco Use    Smoking status: Former     Current packs/day: 1.50     Types: Cigarettes    Smokeless tobacco: Never    Tobacco comments:     1 ppd 25 yrs,quit 2011   Vaping Use    Vaping status: Never Used   Substance and Sexual Activity    Alcohol use: Yes     Alcohol/week: 8.4 - 12.6 oz     Types: 14 - 21 Glasses of wine per week     Comment: wine 2-3 drinks per day     Drug use: No    Sexual activity: Yes     Partners: Female       CURRENT MEDICATIONS  Home Medications    **Home medications have not yet been reviewed for this encounter**         ALLERGIES  Allergies   Allergen Reactions    Nkda [No Known Drug Allergy]        PHYSICAL EXAM  VITAL SIGNS: /71   Pulse 81   Temp 36.4 °C (97.5 °F) (Temporal)   Resp 18   Wt 92.5 kg (204 lb)   SpO2 92%   BMI 27.67 kg/m²    Physical Exam  Vitals and nursing note reviewed.   Constitutional:        Appearance: He is well-developed.   HENT:      Head: Normocephalic.      Comments: No malocclusion or dental injury noted  Cardiovascular:      Rate and Rhythm: Normal rate and regular rhythm.      Heart sounds: No murmur heard.  Pulmonary:      Effort: Pulmonary effort is normal.      Breath sounds: Normal breath sounds.   Abdominal:      Palpations: Abdomen is soft.      Tenderness: There is no abdominal tenderness.   Musculoskeletal:         General: Normal range of motion.      Cervical back: Normal range of motion and neck supple. No tenderness.      Right lower leg: No edema.      Left lower leg: No edema.   Skin:     General: Skin is warm.      Comments: 5 cm laceration overlying the right mandible with exposed bone no obvious open fracture is present   Neurological:      General: No focal deficit present.      Mental Status: He is alert and oriented to person, place, and time.           RADIOLOGY/PROCEDURES   I have independently interpreted the diagnostic imaging associated with this visit and am waiting the final reading from the radiologist.   My preliminary interpretation is as follows: CT head no acute process, CT maxillofacial no acute process, CT C-spine no acute process    Radiologist interpretation:  CT-CSPINE WITHOUT PLUS RECONS   Final Result      No acute fracture is identified      CT-MAXILLOFACIAL W/O PLUS RECONS   Final Result      1.  No facial bone fracture detected.   2.  Deep soft tissue laceration along the anterior right mandible down to the bone.      CT-HEAD W/O   Final Result      1.  No acute intracranial abnormality detected.                 Laceration Repair Procedure    Indication: Laceration    Location/Description: Right mandible    Procedure: The patient was placed in the appropriate position and anesthesia around the laceration was obtained by infiltration using 1% Lidocaine with epinephrine. The area was then irrigated with normal saline. The laceration was closed in two  layers. The subcutaneous layer was closed with 6-0 Vicryl using running sutures. The skin was closed with 5-0 fast absorbing gut suture. There were no additional lacerations requiring repair. The wound area was then dressed with a sterile dressing.      Total repaired wound length: 5 cm.     Other Items: Suture count: 11    The patient tolerated the procedure well.    Complications: None      COURSE & MEDICAL DECISION MAKING    ASSESSMENT, COURSE AND PLAN  Care Narrative: 73-year-old male presenting with right facial laceration overlying the mandible after a mechanical ground-level fall that occurred at home.  CT head and C-spine obtained and negative for intracranial abnormality or acute fracture, subluxation or dislocation.  No mandibular fracture was present however bone was visualized on physical exam of the laceration.  After extensive irrigation and local anesthesia the laceration was repaired in 2 layers.  After repair the patient demonstrated ability to ambulate throughout the ED without assistance.  The patient was given with wound care instructions and return precautions for infected appearance of the wound.              ADDITIONAL PROBLEMS MANAGED  Past Medical History:   Diagnosis Date    Cancer (HCC) 2003    prostate-removed    Dental disorder     implants    ED (erectile dysfunction)        DISPOSITION AND DISCUSSIONS  I have discussed management of the patient with the following physicians and ELISEO's:      Discussion of management with other QHP or appropriate source(s):      Escalation of care considered, and ultimately not performed:Laboratory analysis    Barriers to care at this time, including but not limited to: Patient lacks financial resources.     Decision tools and prescription drugs considered including, but not limited to: .    FINAL DIAGNOSIS  1. Facial laceration, initial encounter Acute   2. Ground-level fall Acute        Electronically signed by: Jer Taylor M.D., 7/31/2024 5:41  PM

## 2024-08-24 DIAGNOSIS — E78.5 DYSLIPIDEMIA: Chronic | ICD-10-CM

## 2024-08-24 RX ORDER — ATORVASTATIN CALCIUM 40 MG/1
40 TABLET, FILM COATED ORAL DAILY
Qty: 90 TABLET | Refills: 1 | Status: SHIPPED | OUTPATIENT
Start: 2024-08-24

## 2024-10-08 DIAGNOSIS — N52.9 ERECTILE DYSFUNCTION, UNSPECIFIED ERECTILE DYSFUNCTION TYPE: ICD-10-CM

## 2024-10-09 RX ORDER — SILDENAFIL 100 MG/1
100 TABLET, FILM COATED ORAL PRN
Qty: 30 TABLET | Refills: 5 | Status: SHIPPED | OUTPATIENT
Start: 2024-10-09

## 2025-01-06 ENCOUNTER — APPOINTMENT (OUTPATIENT)
Dept: MEDICAL GROUP | Facility: MEDICAL CENTER | Age: 75
End: 2025-01-06
Payer: MEDICARE

## 2025-01-06 VITALS
TEMPERATURE: 97.5 F | OXYGEN SATURATION: 96 % | BODY MASS INDEX: 24.53 KG/M2 | DIASTOLIC BLOOD PRESSURE: 78 MMHG | HEIGHT: 75 IN | HEART RATE: 72 BPM | SYSTOLIC BLOOD PRESSURE: 122 MMHG | WEIGHT: 197.3 LBS

## 2025-01-06 DIAGNOSIS — E04.1 THYROID NODULE: ICD-10-CM

## 2025-01-06 DIAGNOSIS — G47.34 NOCTURNAL HYPOXEMIA: ICD-10-CM

## 2025-01-06 DIAGNOSIS — F17.200 TOBACCO DEPENDENCE: ICD-10-CM

## 2025-01-06 DIAGNOSIS — F10.29 ALCOHOL DEPENDENCE WITH UNSPECIFIED ALCOHOL-INDUCED DISORDER (HCC): ICD-10-CM

## 2025-01-06 DIAGNOSIS — R73.01 IMPAIRED FASTING GLUCOSE: Chronic | ICD-10-CM

## 2025-01-06 DIAGNOSIS — Z00.00 PREVENTATIVE HEALTH CARE: Chronic | ICD-10-CM

## 2025-01-06 DIAGNOSIS — J98.11 ATELECTASIS: ICD-10-CM

## 2025-01-06 DIAGNOSIS — E55.9 VITAMIN D DEFICIENCY: ICD-10-CM

## 2025-01-06 DIAGNOSIS — Z23 NEED FOR PNEUMOCOCCAL VACCINATION: ICD-10-CM

## 2025-01-06 DIAGNOSIS — E78.5 DYSLIPIDEMIA: Chronic | ICD-10-CM

## 2025-01-06 DIAGNOSIS — R35.1 NOCTURIA: ICD-10-CM

## 2025-01-06 DIAGNOSIS — Z12.5 PROSTATE CANCER SCREENING: ICD-10-CM

## 2025-01-06 DIAGNOSIS — R06.00 DYSPNEA, UNSPECIFIED TYPE: ICD-10-CM

## 2025-01-06 DIAGNOSIS — Z00.00 MEDICARE ANNUAL WELLNESS VISIT, SUBSEQUENT: ICD-10-CM

## 2025-01-06 DIAGNOSIS — F17.211 NICOTINE DEPENDENCE, CIGARETTES, IN REMISSION: ICD-10-CM

## 2025-01-06 DIAGNOSIS — I47.10 PAROXYSMAL SVT (SUPRAVENTRICULAR TACHYCARDIA) (HCC): ICD-10-CM

## 2025-01-06 PROBLEM — F10.20 ETOH DEPENDENCE (HCC): Status: ACTIVE | Noted: 2025-01-06

## 2025-01-06 PROCEDURE — 3074F SYST BP LT 130 MM HG: CPT | Performed by: INTERNAL MEDICINE

## 2025-01-06 PROCEDURE — G0439 PPPS, SUBSEQ VISIT: HCPCS | Mod: 25 | Performed by: INTERNAL MEDICINE

## 2025-01-06 PROCEDURE — 3078F DIAST BP <80 MM HG: CPT | Performed by: INTERNAL MEDICINE

## 2025-01-06 PROCEDURE — G0009 ADMIN PNEUMOCOCCAL VACCINE: HCPCS | Performed by: INTERNAL MEDICINE

## 2025-01-06 PROCEDURE — 90677 PCV20 VACCINE IM: CPT | Performed by: INTERNAL MEDICINE

## 2025-01-06 ASSESSMENT — ACTIVITIES OF DAILY LIVING (ADL): BATHING_REQUIRES_ASSISTANCE: 0

## 2025-01-06 ASSESSMENT — ENCOUNTER SYMPTOMS: GENERAL WELL-BEING: GOOD

## 2025-01-06 ASSESSMENT — PATIENT HEALTH QUESTIONNAIRE - PHQ9: CLINICAL INTERPRETATION OF PHQ2 SCORE: 0

## 2025-01-06 ASSESSMENT — FIBROSIS 4 INDEX: FIB4 SCORE: 2.48

## 2025-01-06 NOTE — PROGRESS NOTES
Chief Complaint   Patient presents with    Medicare Annual Wellness       HPI:  Roland Hernández is a 74 y.o. here for Medicare Annual Wellness Visit     Patient Active Problem List    Diagnosis Date Noted    Aortic ectasia (HCC) 02/16/2023    History of COVID-19 02/16/2023    Thumb arthritis 12/08/2022    Sleep disorder breathing 09/08/2022    Paroxysmal SVT (supraventricular tachycardia) (HCC) 08/19/2022    Insomnia 11/19/2020    Thyroid nodule 02/10/2020    History of shoulder pain 05/29/2018    Impaired fasting glucose 09/14/2013    S/P appendectomy 09/12/2013    Preventative health care 09/12/2013    Schatzki's ring 09/12/2013    Dyslipidemia 04/09/2013    Erectile dysfunction 04/09/2013    History of prostate cancer 04/09/2013    History of tobacco abuse 04/09/2013       Current Outpatient Medications   Medication Sig Dispense Refill    sildenafil citrate (VIAGRA) 100 MG tablet Take 1 Tablet by mouth as needed for Erectile Dysfunction. One per day 30 Tablet 5    atorvastatin (LIPITOR) 40 MG Tab TAKE 1 TABLET BY MOUTH EVERY DAY 90 Tablet 1    metoprolol SR (TOPROL XL) 25 MG TABLET SR 24 HR TAKE 1 TABLET BY MOUTH EVERY DAY 90 Tablet 3    zolpidem (AMBIEN) 10 MG Tab  (Patient not taking: Reported on 1/6/2025)       No current facility-administered medications for this visit.          Current supplements as per medication list.     Allergies: Nkda [no known drug allergy]    Current social contact/activities: walking, yard work, wife and him do fair amount of shima     He  reports that he has quit smoking. His smoking use included cigarettes. He has never used smokeless tobacco. He reports current alcohol use of about 8.4 oz of alcohol per week. He reports that he does not use drugs.  Counseling given: Not Answered  Tobacco comments: 1 ppd 25 yrs,quit 2011      ROS:    Gait: Uses no assistive device  Ostomy: No  Other tubes: No  Amputations: Yes  Chronic oxygen use: No  Last eye exam: about a year  ago  Wears hearing aids: No   : Reports urinary leakage during the last 6 months that has not interfered at all with their daily activities or sleep.    Screening:  ***  Depression Screening  Little interest or pleasure in doing things?  0 - not at all  Feeling down, depressed , or hopeless? 0 - not at all  Patient Health Questionnaire Score: 0     If depressive symptoms identified deferred to follow up visit unless specifically addressed in assessment and plan.    Interpretation of PHQ-9 Total Score   Score Severity   1-4 No Depression   5-9 Mild Depression   10-14 Moderate Depression   15-19 Moderately Severe Depression   20-27 Severe Depression    Screening for Cognitive Impairment  Do you or any of your friends or family members have any concern about your memory? Yes  Three Minute Recall (Leader, Season, Table) 3/3    Wally clock face with all 12 numbers and set the hands to show 10 minutes after 11.  No    Cognitive concerns identified deferred for follow up unless specifically addressed in assessment and plan.    Fall Risk Assessment  Has the patient had two or more falls in the last year or any fall with injury in the last year?  Yes    Safety Assessment  Do you always wear your seatbelt?  Yes  Any changes to home needed to function safely? No  Difficulty hearing.  Yes  Patient counseled about all safety risks that were identified.    Functional Assessment ADLs  Are there any barriers preventing you from cooking for yourself or meeting nutritional needs?  No.    Are there any barriers preventing you from driving safely or obtaining transportation?  No.    Are there any barriers preventing you from using a telephone or calling for help?  No    Are there any barriers preventing you from shopping?  No.    Are there any barriers preventing you from taking care of your own finances?  No    Are there any barriers preventing you from managing your medications?  No    Are there any barriers preventing you from  showering, bathing or dressing yourself? No    Are there any barriers preventing you from doing housework or laundry? No  Are there any barriers preventing you from using the toilet?No  Are you currently engaging in any exercise or physical activity?  Yes.      Self-Assessment of Health  What is your perception of your health? Good    Do you sleep more than six hours a night? Yes    In the past 7 days, how much did pain keep you from doing your normal work? None    Do you spend quality time with family or friends (virtually or in person)? Yes    Do you usually eat a heart healthy diet that constists of a variety of fruits, vegetables, whole grains and fiber? Yes    Do you eat foods high in fat and/or Fast Food more than three times per week? No    How concerned are you that your medical conditions are not being well managed? Not at all    Are you worried that in the next 2 months, you may not have stable housing that you own, rent, or stay in as part of a household? No      Advance Care Planning  Do you have an Advance Directive, Living Will, Durable Power of , or POLST? Yes  Advance Directive Living Will Durable Power of    is not on file - instructed patient to bring in a copy to scan into their chart      Health Maintenance Summary            Annual Wellness Visit (Yearly) Next due on 1/6/2026 01/06/2025  Visit Dx: Medicare annual wellness visit, subsequent    12/22/2023  Visit Dx: Medicare annual wellness visit, subsequent    12/19/2022  Visit Dx: Medicare annual wellness visit, subsequent    11/18/2021  Done    11/19/2020  Visit Dx: Medicare annual wellness visit, subsequent    Only the first 5 history entries have been loaded, but more history exists.              IMM DTaP/Tdap/Td Vaccine (4 - Td or Tdap) Next due on 12/19/2032 12/19/2022  Imm Admin: Tdap Vaccine    10/27/2015  Imm Admin: Tdap Vaccine    04/09/2013  Imm Admin: Tdap Vaccine              Pneumococcal Vaccine: 65+ Years  (Series Information) Completed      09/18/2018  Imm Admin: Pneumococcal polysaccharide vaccine (PPSV-23)    10/27/2015  Imm Admin: Pneumococcal Conjugate Vaccine (Prevnar/PCV-13)    04/09/2013  Imm Admin: Pneumococcal polysaccharide vaccine (PPSV-23)              Zoster (Shingles) Vaccines (Series Information) Completed      09/30/2019  Imm Admin: Zoster Vaccine Recombinant (RZV) (SHINGRIX)    12/13/2018  Imm Admin: Zoster Vaccine Recombinant (RZV) (SHINGRIX)    09/12/2013  Imm Admin: Zoster Vaccine Live (ZVL) (Zostavax) - HISTORICAL DATA              Hepatitis C Screening  Completed      12/22/2023  Done    10/22/2019  Hepatitis C Antibody component of HEP C VIRUS ANTIBODY              Influenza Vaccine (Series Information) Completed      10/08/2024  Outside Immunization: Influenza, High Dose    10/03/2023  Imm Admin: Influenza Vaccine Adult HD    09/20/2022  Imm Admin: Influenza Vaccine Adult HD    11/01/2021  Imm Admin: Influenza Vaccine Adult HD    09/14/2020  Imm Admin: Influenza, Unspecified - HISTORICAL DATA    Only the first 5 history entries have been loaded, but more history exists.              COVID-19 Vaccine (Series Information) Completed      10/08/2024  Outside Immunization: COVID-19(PFR) 12yrs \T\ up    10/23/2023  Imm Admin: Covid-19 Mrna (Spikevax) Moderna 12+ Years    10/11/2022  Imm Admin: MODERNA BIVALENT BOOSTER SARS-COV-2 VACCINE (6+)    05/16/2022  Imm Admin: MODERNA SARS-COV-2 VACCINE (12+)    03/05/2021  Imm Admin: MODERNA SARS-COV-2 VACCINE (12+)    Only the first 5 history entries have been loaded, but more history exists.              Hepatitis A Vaccine (Hep A) (Series Information) Aged Out      No completion history exists for this topic.              HPV Vaccines (Series Information) Aged Out      No completion history exists for this topic.              Polio Vaccine (Inactivated Polio) (Series Information) Aged Out      No completion history exists for this topic.               "Meningococcal Immunization (Series Information) Aged Out      No completion history exists for this topic.              Discontinued - Colorectal Cancer Screening  Discontinued        Frequency changed to Never automatically (Topic No Longer Applies)    01/26/2023  COLOGUARD COLON CANCER SCREENING    11/13/2017  REFERRAL TO GI FOR COLONOSCOPY    11/14/2012  AMB REFERRAL TO GI FOR COLONOSCOPY              Discontinued - Hepatitis B Vaccine (Hep B)  Discontinued      No completion history exists for this topic.              Discontinued - Abdominal Aortic Aneurysm (AAA) Screening  Discontinued        Frequency changed to Never automatically (Topic No Longer Applies)    12/22/2023  Done    02/16/2023  US-ABDOMINAL AORTA W/O DOPPLER                    Patient Care Team:  Marcell Martinez M.D. as PCP - General (Internal Medicine)        Social History     Tobacco Use    Smoking status: Former     Current packs/day: 1.50     Types: Cigarettes    Smokeless tobacco: Never    Tobacco comments:     1 ppd 25 yrs,quit 2011   Vaping Use    Vaping status: Never Used   Substance Use Topics    Alcohol use: Yes     Alcohol/week: 8.4 oz     Types: 14 Glasses of wine per week     Comment: about 2 per day    Drug use: No     Family History   Problem Relation Age of Onset    Dementia Mother     Cancer Father         bladder cancer    Cancer Sister          lymphatic     He  has a past medical history of Cancer (HCC) (2003), Dental disorder, and ED (erectile dysfunction).   Past Surgical History:   Procedure Laterality Date    MASS EXCISION ORTHO  3/22/2010    Performed by TODD ORDONEZ at SURGERY SAME DAY [facility]    PROSTATECTOMY ROBOTIC  2003    APPENDECTOMY      TONSILLECTOMY         Exam:   Ht 1.905 m (6' 3\")   Wt 89.5 kg (197 lb 4.8 oz)  Body mass index is 24.66 kg/m².    Hearing good.    Dentition : 4 implants in bottom of mouth, two in upper left side  Alert, oriented in no acute distress.  Eye contact is good, speech " goal directed, affect calm    Assessment and Plan. The following treatment and monitoring plan is recommended:  ***  1. Medicare annual wellness visit, subsequent      Services suggested: { AWV COORDINATION OF SERVICES:20405}  Health Care Screening: Age-appropriate preventive services recommended by USPTF and ACIP covered by Medicare were discussed today. Services ordered if indicated and agreed upon by the patient.  Referrals offered: Community-based lifestyle interventions to reduce health risks and promote self-management and wellness, fall prevention, nutrition, physical activity, tobacco-use cessation, weight loss, and mental health services as per orders if indicated.    Discussion today about general wellness and lifestyle habits:    Prevent falls and reduce trip hazards; Cautioned about securing or removing rugs.  Have a working fire alarm and carbon monoxide detector;   Engage in regular physical activity and social activities     Follow-up: Return in about 6 months (around 7/6/2025).

## 2025-01-06 NOTE — PROGRESS NOTES
Subjective     Hadley Hernández is a 74 y.o. male who presents with medicare wellness          HPI        Medicare wellness  Current supplements: Reviewed  Chronic narcotic pain medicines: No  Allergies:  reviewed  Medications, allergies, medical history, surgical history, social history, family history  reviewed and updated  Eye exam none this past year  Dental exam four times per year and does brush and uses water pick  Screening: Reviewed  Depressive Symptoms: Denies feeling down, depressed or hopeless. Denies loss of interest or pleasure in doing things   ADLs: Denies needing help with using telephone, transportation, shopping, preparing meals, housework, laundry, or managing medication or money.    Independent with bathing, hygiene, feeding, toileting, dressing    Memory concerns: Denies difficulty remembering details of conversations, events and upcoming appointments.  Hearing problems: Denies.   Falls twice this year once going to the mailbox and slipped on [place] driveway  Walks 3-4 times per week for exercise   No sweets or candies, eats healthy fresh fruits and veggies, pasta and bread for dinner   Blood pressure runs 124/78 on metoprolol, no chest pain or sob, no palpitations, no sob with activity. Tries to follow a low sodium diet.   current social contact/activities: walking, yard work, wife and him do fair amount of shima   He  reports that he has quit smoking. Started smoking age 15 and quit over 15 years ago. Smoked up to one pack per day and at the end was only smoking cigars.   His smoking use included cigarettes. He has never used smokeless tobacco. He reports current alcohol use of about 8.4 oz of alcohol per week. He reports that he does not use drugs.  , goes to bed at 10 pm and gets up 6 am, no naps   No snoring or stopping breathing at night   Counseling given: Not Answered  Tobacco comments:started age 15 and smoked 1/2 to 1 ppd until age 58,quit 2008   Etoh wine 2 to 3 per day          ROS:    Gait: Uses no assistive device  Ostomy: No  Other tubes: No  Amputations: Yes  Chronic oxygen use: No  Last eye exam: about a year ago  Wears hearing aids: No   : Reports urinary leakage during the last 6 months that has not interfered at all with their daily activities or sleep.  Left knee sometimes will bother him going down steps         Current Outpatient Medications   Medication Sig Dispense Refill    sildenafil citrate (VIAGRA) 100 MG tablet Take 1 Tablet by mouth as needed for Erectile Dysfunction. One per day 30 Tablet 5    atorvastatin (LIPITOR) 40 MG Tab TAKE 1 TABLET BY MOUTH EVERY DAY 90 Tablet 1    metoprolol SR (TOPROL XL) 25 MG TABLET SR 24 HR TAKE 1 TABLET BY MOUTH EVERY DAY 90 Tablet 3    zolpidem (AMBIEN) 10 MG Tab  (Patient not taking: Reported on 1/6/2025)       No current facility-administered medications for this visit.           thyroid nodule  2/10/20 ultrasound thyroid left nodule 1.1 x 0.9 x 1.0 cm TIRADS 6, repeat 1 year ultrasound  12/3/20 tsh 2.8  2/23/20 ultrasound thyroid 1.2 x 1.1 x 1.0 cm (previously 1.1 x 0.9 x 1.0 cm) left lower pole thyroid isoechoic solid mass TIRADS 3, mildly suspicious has enlarged slightly, recommend repeat ultrasound 1 year  12/10/21 ultrasound thyroid left lower nodule 1.0 x 1.1 x 1.1 cm (previously 1.2 x 1.1 x 1.0 cm)  12/21/21 tsh 4.3  7/29/22 tsh 3.2  12/8/22 ultrasound thyroid left lower 1.34 x 1.21 x 1.10 cm nodule (previously 1.1 x 1.1 x 1.0 cm), left lower 0.71 x 0.59 x 0.63 cm nodule, no significant change  2/24/23 tsh 3.8  1/8/24 tsh 2.9  1/29/24 ultrasound thyroid left lower pole nodule 1.2 x 1.0 x 1.0 cm (previously 1.3 x 1.2 x 1.1 cm), repeat 1 year     thumb arthritis  12/8/22 x-ray right thumb, no evidence of fracture or dislocation, osteoarthritis first carpometacarpal articulation with small osteophyte     sleep disordered breathing  8/27/22 OPO by asaf average 90% time less than 88% jose 69% declines sleep apnea evaluation       psvt  7/25/22 symptoms of body tingling and nausea first awakening in the morning, does not seem to have the rest of the day, also has had at least 1 episode of some lightheadedness when going from sitting to standing, EKG ordered, echo, event monitor x2 weeks, labs, overnight pulse oximetry for stopping breathing at night per wife to asaf  8/1/22 to 8/15/22 zio monitor average heart rate 83, maximum 190, minimum 53, no atrial fibrillation, 9 episodes of SVT fastest 20 beats maximum rate 190 longest lasting 15 seconds with average rate of 174, no heart block, no pauses, no ventricular tachycardia, less than 1% PAC and PVC burden  8/21/22 patient declines beta blocker for now since no palpitations  8/27/22 OPO by [facility] average 90% time less than 88% jose 69% declines sleep apnea evaluation   8/29/22 start metoprolol 25 mg   11/11/22 echo normal LV function, EF 60 to 65%, RVSP 30, mild MR  12/19/22 on metoprolol 25 mg     Schatzki's ring  11/14/12 EGD per [facility], schatzki's ring, gastritis, 57 french dilation     sleep disordered breathing  8/27/22 OPO by [facility] average 90% time less than 88% jose 69% declines sleep apnea evaluation      Status post appendectomy     prev health  10/27/15 prevnar  11/13/17 colon per [facility] diverticulosis, otherwise negative repeat 10 years  9/18/18 pneumovax  9/30/19 shingrix second in series   10/22/19 hep c ab negative  12/19/22 tdap  1/22/23 cologuard negative  10/12/23 rsv  1/8/24 vit d 47  1/8/24 psa<0.02  10/8/24 covid  10/8/24 flu      insomnia  11/19/20 difficulty with sleep maintenance, has tried melatonin and ambien, continue ambien as needed     Impaired fasting blood sugar  6/7/10 A1c 6.0%, bs 109  12/5/11 A1c 5.8%, bs 146  9/26/13 A1c 6.1%,bs 140  12/5/14 A1c 5.8%,bs 111  12/5/15 A1c 5.9% done at [facility] benefit plan  10/17/17 A1c 5.5% per [facility] plan  6/7/18 A1c 5.5%  10/22/19 bs 114  12/3/20 A1c 5.6%  2/15/21 A1c 5.8%  7/29/22 A1c 5.6%  2/24/23 A1c 5.6%  1/8/24 A1c  5.5%      History tobacco  Hx of tobacco quit 2010 after smoking 30 yrs     histoyr of shoulder pain  5/29/18 refer to [facility] phone [phone], fax [phone]  6/7/18 right shoulder x-ray arthritis proceed with physical therapy  6/7/18 [facility] note     History of prostate cancer  2003 diagnosed with prostate cancer s/p radical prostactomy   4/14/04 psa <0.1  11/5/08 psa <0.1  6/7/10 psa <0.1  12/5/11 psa <0.1  9/26/13 psa <0.01  12/5/14 psa <0.1  12/5/15 psa 0.1 done at [facility] benefit plan  10/17/17 psa<0.1 per [facility] plan  6/7/18 psa<0.01  10/22/19 psa<0.01  12/3/20 psa<0.02  12/21/21 psa<0.02  7/29/22 psa<0.02  1/8/24 psa<0.02     Dyslipidemia  10/17/04 chol 230,trig 67,hdl 63,ldl 154  11/5/08 chol 243,trig 69,hdl 63,ldl 166  2010 started on niacin 2000 mg daily  6/7/10 chol 234,trig 114,hdl 63,ldl 148  12/5/11 chol 198,trig 96,hdl 57,ldl 122,crp 0.8  9/26/13 chol 249,trig 143,hdl 53,ldl 167,LDL-P 2088,HDL-P 42,LP-IR 70  12/5/14 chol 214,trig 62,hdl 74,ldl 128,crp 0.8  12/5/15 chol 190,trig 62,hdl 64,ldl 114 done at [facility] benefit plan  10/17/17 chol 250,trig 134,hdl 58,ldl 165 per [facility] plan  6/7/18 chol 218,trig 59,hdl 65,ldl 141  10/22/19 chol 253,trig 93,hdl 72,ldl 162 offered statin  10/29/19 patient declines statin  12/3/20 chol 240,trig 101,hdl 66,ldl 154; 10 year risk 19% declines statin  12/17/20 CT cardiac calcium score LMA 80.4, LCx 0.0, LAD 80.6, .4 = 302.4  12/17/20 start lipitor 40 mg repeat labs 4 weeks  2/15/21 chol 162,trig 161,hdl 70,ldl 60 on lipitor 40 mg  2/23/21 chol 163,trig 75,hdl 75,ldl 73 on lipitor 40 mg  10/26/21 chol 172,trig 69,hdl 85,ldl 73 on lipitor 40 mg  7/29/22 chol 159,trig 72,hdl 71,ldl 74 on lipitor 40 mg  2/24/23 chol 182,trig 66,hdl 82,ldl 87 on lipitor 40 mg  1/8/24 chol 175,trig 76,hdl 79,ldl 81 on lipitor 40 mg     aortic ectasia  2/16/23 ultrasound aorta minimal ectasia no discrete aneurysm             Patient Active Problem List   Diagnosis     "Dyslipidemia    Erectile dysfunction    History of prostate cancer    History of tobacco abuse    S/P appendectomy    Preventative health care    Schatzki's ring    Impaired fasting glucose    History of shoulder pain    Thyroid nodule    Insomnia    Paroxysmal SVT (supraventricular tachycardia) (HCC)    Sleep disorder breathing    Thumb arthritis    Aortic ectasia (HCC)    History of COVID-19                 Health Care Screening recommendations reviewed with patient today and updated or ordered.  DPA/Advanced directive: Completed/Information provided.   Referrals for PT/OT/Nutrition counseling/Behavioral Health/Smoking cessation as above if indicated  Discussion today about general wellness and lifestyle habits:    Prevent falls and reduce trip hazards;   Have a working fire alarm and carbon monoxide detector;   Engage in regular physical activity and social activities;   Use sun protection when outdoors.       ROS           Objective     Ht 1.905 m (6' 3\")   Wt 89.5 kg (197 lb 4.8 oz)   BMI 24.66 kg/m²      Physical Exam  Vitals and nursing note reviewed.   Constitutional:       General: He is not in acute distress.     Appearance: Normal appearance.   HENT:      Head: Normocephalic and atraumatic.      Right Ear: External ear normal.      Left Ear: External ear normal.      Nose: Nose normal.   Eyes:      Conjunctiva/sclera: Conjunctivae normal.   Cardiovascular:      Rate and Rhythm: Normal rate and regular rhythm.      Heart sounds: Normal heart sounds.   Pulmonary:      Effort: Pulmonary effort is normal.      Breath sounds: Normal breath sounds.   Abdominal:      General: There is no distension.   Musculoskeletal:         General: No swelling.      Cervical back: Neck supple. No rigidity.   Skin:     Coloration: Skin is not jaundiced.      Findings: No bruising.   Neurological:      General: No focal deficit present.      Mental Status: He is alert.   Psychiatric:         Mood and Affect: Mood normal.    "      Behavior: Behavior normal.                             Assessment & Plan   Assessment  #1  Medicare wellness assessment    #2 Dyslipidemia 1/8/24 chol 175,trig 76,hdl 79,ldl 81 on lipitor 40 mg     #3 impaired glucose metabolism in the past A1c last year 5.5%    #4 History SVT paroxysmal, on metoprolol no recurrence    #5 History of nocturnal hypoxemia by overnight pulse oximetry, 2022 had previously declined sleep apnea evaluation    #6 History of prostate cancer status post prostatectomy 2003 no recurrence most recent PSA January 8 of last year undetectable    #7 Thyroid nodule by ultrasound 1/29/24 ultrasound thyroid left lower pole nodule 1.2 x 1.0 x 1.0 cm (previously 1.3 x 1.2 x 1.1 cm), repeat 1 year    #8 aortic ectasia    #9 Vitamin D deficiency    #10 nicotine dependence in remission started smoking age 15 and smoked 1/2 to 1 ppd until age 58,quit 2008, so over the years half a pack to 1 pack a day and at the end of his smoking career was only smoking cigars and not cigarettes but that was for an unclear duration, so estimated at least 30 packs/day smoking tobacco history over the years    #11 occasional nocturia    #12 history of nocturnal hypoxemia by opioids declined sleep evaluation in the past 8/27/22 OPO by asaf average 90% time less than 88% jose 69% declines sleep apnea evaluation, no snoring, no stopping breathing at night, STOP-BANG score 2-3    #13 history of fall slipping on ice once this past year, no subsequent falls      Plan  #1 health maintenance reviewed and updated    #2 Labs    #3 Prevnar 20     #4 ultrasound thyroid follow-up nodule    #5 referral melio REM sleep apnea evaluation    #6 continue metoprolol, monitor for recurrent PSVT symptoms, can limit caffeine    #7 continue no tobacco    #8 Need copy of advance directives and power of , asked patient to drop it off here    #9 offered physical therapy for gait evaluation, he declines    #10 recommend decreased alcohol  intake as long-term alcohol may increase risk for cardiovascular disease, mood changes, memory loss    #11 PFTs with history of tobacco, question atelectasis    #12 continue his good nutrition and exercise program    #13 follow-up 1 year

## 2025-01-07 ENCOUNTER — TELEPHONE (OUTPATIENT)
Dept: HEALTH INFORMATION MANAGEMENT | Facility: OTHER | Age: 75
End: 2025-01-07

## 2025-01-30 ENCOUNTER — TELEPHONE (OUTPATIENT)
Dept: MEDICAL GROUP | Facility: MEDICAL CENTER | Age: 75
End: 2025-01-30
Payer: MEDICARE

## 2025-01-30 ENCOUNTER — HOSPITAL ENCOUNTER (OUTPATIENT)
Facility: MEDICAL CENTER | Age: 75
End: 2025-01-30
Attending: INTERNAL MEDICINE
Payer: MEDICARE

## 2025-01-30 DIAGNOSIS — E78.5 DYSLIPIDEMIA: Chronic | ICD-10-CM

## 2025-01-30 DIAGNOSIS — E03.9 HYPOTHYROIDISM, UNSPECIFIED TYPE: ICD-10-CM

## 2025-01-30 DIAGNOSIS — Z12.5 PROSTATE CANCER SCREENING: ICD-10-CM

## 2025-01-30 DIAGNOSIS — R73.01 IMPAIRED FASTING GLUCOSE: Chronic | ICD-10-CM

## 2025-01-30 DIAGNOSIS — D75.89 MACROCYTOSIS: ICD-10-CM

## 2025-01-30 DIAGNOSIS — R35.1 NOCTURIA: ICD-10-CM

## 2025-01-30 DIAGNOSIS — R79.89 ABNORMAL LIVER FUNCTION TEST: ICD-10-CM

## 2025-01-30 DIAGNOSIS — E55.9 VITAMIN D DEFICIENCY: ICD-10-CM

## 2025-01-30 DIAGNOSIS — R74.8 ABNORMAL LEVELS OF OTHER SERUM ENZYMES: ICD-10-CM

## 2025-01-30 PROBLEM — E03.8 SUBCLINICAL HYPOTHYROIDISM: Status: ACTIVE | Noted: 2025-01-30

## 2025-01-30 LAB
25(OH)D3 SERPL-MCNC: 25 NG/ML (ref 30–100)
ALBUMIN SERPL BCP-MCNC: 4.1 G/DL (ref 3.2–4.9)
ALBUMIN/GLOB SERPL: 1.3 G/DL
ALP SERPL-CCNC: 55 U/L (ref 30–99)
ALT SERPL-CCNC: 35 U/L (ref 2–50)
ANION GAP SERPL CALC-SCNC: 12 MMOL/L (ref 7–16)
AST SERPL-CCNC: 48 U/L (ref 12–45)
BASOPHILS # BLD AUTO: 1.4 % (ref 0–1.8)
BASOPHILS # BLD: 0.08 K/UL (ref 0–0.12)
BILIRUB SERPL-MCNC: 0.8 MG/DL (ref 0.1–1.5)
BUN SERPL-MCNC: 9 MG/DL (ref 8–22)
CALCIUM ALBUM COR SERPL-MCNC: 9 MG/DL (ref 8.5–10.5)
CALCIUM SERPL-MCNC: 9.1 MG/DL (ref 8.4–10.2)
CHLORIDE SERPL-SCNC: 98 MMOL/L (ref 96–112)
CHOLEST SERPL-MCNC: 183 MG/DL (ref 100–199)
CO2 SERPL-SCNC: 25 MMOL/L (ref 20–33)
CREAT SERPL-MCNC: 0.91 MG/DL (ref 0.5–1.4)
EOSINOPHIL # BLD AUTO: 0.13 K/UL (ref 0–0.51)
EOSINOPHIL NFR BLD: 2.3 % (ref 0–6.9)
ERYTHROCYTE [DISTWIDTH] IN BLOOD BY AUTOMATED COUNT: 48.5 FL (ref 35.9–50)
EST. AVERAGE GLUCOSE BLD GHB EST-MCNC: 123 MG/DL
FASTING STATUS PATIENT QL REPORTED: NORMAL
GFR SERPLBLD CREATININE-BSD FMLA CKD-EPI: 87 ML/MIN/1.73 M 2
GLOBULIN SER CALC-MCNC: 3.1 G/DL (ref 1.9–3.5)
GLUCOSE SERPL-MCNC: 106 MG/DL (ref 65–99)
HBA1C MFR BLD: 5.9 % (ref 4–5.6)
HCT VFR BLD AUTO: 45.2 % (ref 42–52)
HDLC SERPL-MCNC: 77 MG/DL
HGB BLD-MCNC: 15 G/DL (ref 14–18)
IMM GRANULOCYTES # BLD AUTO: 0.01 K/UL (ref 0–0.11)
IMM GRANULOCYTES NFR BLD AUTO: 0.2 % (ref 0–0.9)
LDLC SERPL CALC-MCNC: 91 MG/DL
LYMPHOCYTES # BLD AUTO: 1.69 K/UL (ref 1–4.8)
LYMPHOCYTES NFR BLD: 30.3 % (ref 22–41)
MCH RBC QN AUTO: 34.3 PG (ref 27–33)
MCHC RBC AUTO-ENTMCNC: 33.2 G/DL (ref 32.3–36.5)
MCV RBC AUTO: 103.4 FL (ref 81.4–97.8)
MONOCYTES # BLD AUTO: 0.46 K/UL (ref 0–0.85)
MONOCYTES NFR BLD AUTO: 8.3 % (ref 0–13.4)
NEUTROPHILS # BLD AUTO: 3.2 K/UL (ref 1.82–7.42)
NEUTROPHILS NFR BLD: 57.5 % (ref 44–72)
NRBC # BLD AUTO: 0 K/UL
NRBC BLD-RTO: 0 /100 WBC (ref 0–0.2)
PLATELET # BLD AUTO: 217 K/UL (ref 164–446)
PMV BLD AUTO: 10.7 FL (ref 9–12.9)
POTASSIUM SERPL-SCNC: 4.5 MMOL/L (ref 3.6–5.5)
PROT SERPL-MCNC: 7.2 G/DL (ref 6–8.2)
PSA SERPL-MCNC: <0.02 NG/ML (ref 0–4)
RBC # BLD AUTO: 4.37 M/UL (ref 4.7–6.1)
SODIUM SERPL-SCNC: 135 MMOL/L (ref 135–145)
TRIGL SERPL-MCNC: 73 MG/DL (ref 0–149)
TSH SERPL DL<=0.005 MIU/L-ACNC: 5.98 UIU/ML (ref 0.38–5.33)
WBC # BLD AUTO: 5.6 K/UL (ref 4.8–10.8)

## 2025-01-30 PROCEDURE — 80061 LIPID PANEL: CPT

## 2025-01-30 PROCEDURE — 36415 COLL VENOUS BLD VENIPUNCTURE: CPT | Mod: GA

## 2025-01-30 PROCEDURE — 84443 ASSAY THYROID STIM HORMONE: CPT

## 2025-01-30 PROCEDURE — 80053 COMPREHEN METABOLIC PANEL: CPT

## 2025-01-30 PROCEDURE — 85025 COMPLETE CBC W/AUTO DIFF WBC: CPT

## 2025-01-30 PROCEDURE — 83036 HEMOGLOBIN GLYCOSYLATED A1C: CPT | Mod: GA

## 2025-01-30 PROCEDURE — 84153 ASSAY OF PSA TOTAL: CPT | Mod: GA

## 2025-01-30 PROCEDURE — 82306 VITAMIN D 25 HYDROXY: CPT

## 2025-01-31 NOTE — TELEPHONE ENCOUNTER
Notified with labs  A1c a bit elevated, work on limiting sweets, candies, processed foods, carbohydrate portion serving sizes and alcohol  TSH slightly elevated, will repeat TSH and thyroid labs 3 months, orders printed to mail, vitamin D low start vitamin D 5000 units daily  Macrocytosis he will cut back on alcohol from 2 glasses of wine to 1 if possible, recheck B12 and CBC next lab, minimally elevated AST again cutting back on alcohol will be beneficial, recheck labs including Bucio.  Cholesterol remains excellent continue Lipitor no changes.  Gave patient number to call to schedule the ultrasound thyroid and CT lung cancer screening [phone]  Repeat labs ordered, printed, nonfasting, mailed to patient to be done in 3 months      tsh and free t4, hep function panel, bucio, b12,folate

## 2025-03-07 ENCOUNTER — PATIENT OUTREACH (OUTPATIENT)
Dept: HEALTH INFORMATION MANAGEMENT | Facility: OTHER | Age: 75
End: 2025-03-07
Payer: MEDICARE

## 2025-03-07 NOTE — PROGRESS NOTES
CHW mailed pt the Advanced Directive packet and will follow up with pt next week to ensure that he received it.

## 2025-03-14 ENCOUNTER — PATIENT OUTREACH (OUTPATIENT)
Dept: HEALTH INFORMATION MANAGEMENT | Facility: OTHER | Age: 75
End: 2025-03-14
Payer: MEDICARE

## 2025-03-14 NOTE — PROGRESS NOTES
CHW attempted to reach pt to ensure that he received the Advanced Directive packet that was mailed. Pt did not answer and a vm was left asking for a return call.

## 2025-03-18 ENCOUNTER — PATIENT OUTREACH (OUTPATIENT)
Dept: HEALTH INFORMATION MANAGEMENT | Facility: OTHER | Age: 75
End: 2025-03-18
Payer: MEDICARE

## 2025-03-18 NOTE — PROGRESS NOTES
Pt states that he and his spouse have had an advanced directive for the last 20 yrs. Pt states that they will try to bring it in within the next week or when they find the documents, to upload a copy to their charts. CHW will no longer follow.

## 2025-04-24 DIAGNOSIS — E78.5 DYSLIPIDEMIA: Chronic | ICD-10-CM

## 2025-04-24 NOTE — TELEPHONE ENCOUNTER
Received request via: Pharmacy    Was the patient seen in the last year in this department? Yes    Does the patient have an active prescription (recently filled or refills available) for medication(s) requested? No    [email or web address]     Does the patient have custodial Plus and need 100-day supply? (This applies to ALL medications) Patient does not have SCP

## 2025-04-25 RX ORDER — ATORVASTATIN CALCIUM 40 MG/1
40 TABLET, FILM COATED ORAL DAILY
Qty: 100 TABLET | Refills: 2 | Status: SHIPPED | OUTPATIENT
Start: 2025-04-25

## 2025-05-23 RX ORDER — METOPROLOL SUCCINATE 25 MG/1
25 TABLET, EXTENDED RELEASE ORAL DAILY
Qty: 100 TABLET | Refills: 1 | Status: SHIPPED | OUTPATIENT
Start: 2025-05-23

## 2025-06-10 ENCOUNTER — TELEPHONE (OUTPATIENT)
Dept: HEALTH INFORMATION MANAGEMENT | Facility: OTHER | Age: 75
End: 2025-06-10
Payer: MEDICARE

## 2025-06-10 NOTE — TELEPHONE ENCOUNTER
Outcome: Patient request call back     Patient has a CT-Lung Cancer Screening order ready to schedule in the active requests tab. Patient needs to be asked prescreening questions before scheduling.     Please transfer to [facility] at [phone].     Attempt: 1